# Patient Record
Sex: FEMALE | Race: WHITE | NOT HISPANIC OR LATINO | Employment: OTHER | URBAN - NONMETROPOLITAN AREA
[De-identification: names, ages, dates, MRNs, and addresses within clinical notes are randomized per-mention and may not be internally consistent; named-entity substitution may affect disease eponyms.]

---

## 2017-05-03 ENCOUNTER — APPOINTMENT (OUTPATIENT)
Dept: PREADMISSION TESTING | Facility: HOSPITAL | Age: 47
End: 2017-05-03

## 2017-05-03 VITALS
DIASTOLIC BLOOD PRESSURE: 86 MMHG | BODY MASS INDEX: 24.32 KG/M2 | WEIGHT: 146 LBS | HEART RATE: 72 BPM | SYSTOLIC BLOOD PRESSURE: 139 MMHG | HEIGHT: 65 IN | OXYGEN SATURATION: 99 %

## 2017-05-03 DIAGNOSIS — Z01.818 PRE-OP EXAM: Primary | ICD-10-CM

## 2017-05-03 LAB
25(OH)D3 SERPL-MCNC: 57.5 NG/ML (ref 30–100)
DEPRECATED RDW RBC AUTO: 38.4 FL (ref 40–54)
ERYTHROCYTE [DISTWIDTH] IN BLOOD BY AUTOMATED COUNT: 12.6 % (ref 12–15)
HCG SERPL QL: NEGATIVE
HCT VFR BLD AUTO: 41 % (ref 37–47)
HGB BLD-MCNC: 14.1 G/DL (ref 12–16)
MCH RBC QN AUTO: 29.1 PG (ref 28–32)
MCHC RBC AUTO-ENTMCNC: 34.4 G/DL (ref 33–36)
MCV RBC AUTO: 84.5 FL (ref 82–98)
PLATELET # BLD AUTO: 317 10*3/MM3 (ref 130–400)
PMV BLD AUTO: 9.2 FL (ref 6–12)
RBC # BLD AUTO: 4.85 10*6/MM3 (ref 4.2–5.4)
WBC NRBC COR # BLD: 6.35 10*3/MM3 (ref 4.8–10.8)

## 2017-05-03 RX ORDER — ALPRAZOLAM 0.25 MG/1
0.25 TABLET ORAL NIGHTLY PRN
COMMUNITY
End: 2017-05-08 | Stop reason: HOSPADM

## 2017-05-03 RX ORDER — ERGOCALCIFEROL 1.25 MG/1
50000 CAPSULE ORAL SEE ADMIN INSTRUCTIONS
COMMUNITY
End: 2017-05-08 | Stop reason: HOSPADM

## 2017-05-03 RX ORDER — SODIUM CHLORIDE 0.9 % (FLUSH) 0.9 %
1-10 SYRINGE (ML) INJECTION AS NEEDED
Status: CANCELLED | OUTPATIENT
Start: 2017-05-03

## 2017-05-03 RX ORDER — MULTIPLE VITAMINS W/ MINERALS TAB 9MG-400MCG
1 TAB ORAL DAILY
COMMUNITY
End: 2017-05-08 | Stop reason: HOSPADM

## 2017-05-03 RX ORDER — FLUOXETINE 10 MG/1
10 CAPSULE ORAL DAILY PRN
COMMUNITY
End: 2017-05-08 | Stop reason: HOSPADM

## 2017-05-04 LAB — PROLACTIN SERPL-MCNC: 11.4 NG/ML (ref 4.8–23.3)

## 2017-05-05 ENCOUNTER — HOSPITAL ENCOUNTER (OUTPATIENT)
Facility: HOSPITAL | Age: 47
Setting detail: HOSPITAL OUTPATIENT SURGERY
Discharge: HOME | End: 2017-05-05
Attending: OBSTETRICS & GYNECOLOGY | Admitting: OBSTETRICS & GYNECOLOGY

## 2017-05-05 ENCOUNTER — ANESTHESIA EVENT (OUTPATIENT)
Dept: PERIOP | Facility: HOSPITAL | Age: 47
End: 2017-05-05

## 2017-05-05 ENCOUNTER — ANESTHESIA (OUTPATIENT)
Dept: PERIOP | Facility: HOSPITAL | Age: 47
End: 2017-05-05

## 2017-05-05 VITALS
HEART RATE: 66 BPM | OXYGEN SATURATION: 98 % | RESPIRATION RATE: 18 BRPM | SYSTOLIC BLOOD PRESSURE: 113 MMHG | TEMPERATURE: 98.1 F | DIASTOLIC BLOOD PRESSURE: 65 MMHG

## 2017-05-05 DIAGNOSIS — N92.0 MENORRHAGIA: ICD-10-CM

## 2017-05-05 PROCEDURE — 25010000002 PROPOFOL 10 MG/ML EMULSION: Performed by: NURSE ANESTHETIST, CERTIFIED REGISTERED

## 2017-05-05 PROCEDURE — 25010000002 FENTANYL CITRATE (PF) 100 MCG/2ML SOLUTION: Performed by: ANESTHESIOLOGY

## 2017-05-05 PROCEDURE — 25010000002 DEXAMETHASONE PER 1 MG: Performed by: ANESTHESIOLOGY

## 2017-05-05 PROCEDURE — 25010000002 KETOROLAC TROMETHAMINE PER 15 MG: Performed by: NURSE ANESTHETIST, CERTIFIED REGISTERED

## 2017-05-05 PROCEDURE — 88305 TISSUE EXAM BY PATHOLOGIST: CPT | Performed by: OBSTETRICS & GYNECOLOGY

## 2017-05-05 PROCEDURE — 25010000002 MIDAZOLAM PER 1 MG: Performed by: ANESTHESIOLOGY

## 2017-05-05 PROCEDURE — 25010000002 ONDANSETRON PER 1 MG: Performed by: NURSE ANESTHETIST, CERTIFIED REGISTERED

## 2017-05-05 RX ORDER — MEPERIDINE HYDROCHLORIDE 25 MG/ML
12.5 INJECTION INTRAMUSCULAR; INTRAVENOUS; SUBCUTANEOUS
Status: DISCONTINUED | OUTPATIENT
Start: 2017-05-05 | End: 2017-05-05 | Stop reason: HOSPADM

## 2017-05-05 RX ORDER — SODIUM CHLORIDE 0.9 % (FLUSH) 0.9 %
1-10 SYRINGE (ML) INJECTION AS NEEDED
Status: DISCONTINUED | OUTPATIENT
Start: 2017-05-05 | End: 2017-05-05 | Stop reason: HOSPADM

## 2017-05-05 RX ORDER — LABETALOL HYDROCHLORIDE 5 MG/ML
5 INJECTION, SOLUTION INTRAVENOUS
Status: DISCONTINUED | OUTPATIENT
Start: 2017-05-05 | End: 2017-05-05 | Stop reason: HOSPADM

## 2017-05-05 RX ORDER — MIDAZOLAM HYDROCHLORIDE 1 MG/ML
2 INJECTION INTRAMUSCULAR; INTRAVENOUS
Status: DISCONTINUED | OUTPATIENT
Start: 2017-05-05 | End: 2017-05-05 | Stop reason: HOSPADM

## 2017-05-05 RX ORDER — DEXAMETHASONE SODIUM PHOSPHATE 4 MG/ML
4 INJECTION, SOLUTION INTRA-ARTICULAR; INTRALESIONAL; INTRAMUSCULAR; INTRAVENOUS; SOFT TISSUE ONCE AS NEEDED
Status: COMPLETED | OUTPATIENT
Start: 2017-05-05 | End: 2017-05-05

## 2017-05-05 RX ORDER — NALOXONE HCL 0.4 MG/ML
0.4 VIAL (ML) INJECTION AS NEEDED
Status: DISCONTINUED | OUTPATIENT
Start: 2017-05-05 | End: 2017-05-05 | Stop reason: HOSPADM

## 2017-05-05 RX ORDER — PROPOFOL 10 MG/ML
VIAL (ML) INTRAVENOUS AS NEEDED
Status: DISCONTINUED | OUTPATIENT
Start: 2017-05-05 | End: 2017-05-05 | Stop reason: SURG

## 2017-05-05 RX ORDER — SODIUM CHLORIDE, SODIUM LACTATE, POTASSIUM CHLORIDE, CALCIUM CHLORIDE 600; 310; 30; 20 MG/100ML; MG/100ML; MG/100ML; MG/100ML
9 INJECTION, SOLUTION INTRAVENOUS CONTINUOUS
Status: DISCONTINUED | OUTPATIENT
Start: 2017-05-05 | End: 2017-05-05 | Stop reason: HOSPADM

## 2017-05-05 RX ORDER — NAPROXEN SODIUM 220 MG
220 TABLET ORAL 2 TIMES DAILY PRN
COMMUNITY
End: 2017-05-08 | Stop reason: HOSPADM

## 2017-05-05 RX ORDER — HYDROCODONE BITARTRATE AND ACETAMINOPHEN 5; 325 MG/1; MG/1
1 TABLET ORAL EVERY 6 HOURS PRN
Status: DISCONTINUED | OUTPATIENT
Start: 2017-05-05 | End: 2017-05-05 | Stop reason: HOSPADM

## 2017-05-05 RX ORDER — IBUPROFEN 600 MG/1
600 TABLET ORAL EVERY 6 HOURS PRN
Status: DISCONTINUED | OUTPATIENT
Start: 2017-05-05 | End: 2017-05-05 | Stop reason: HOSPADM

## 2017-05-05 RX ORDER — ONDANSETRON 2 MG/ML
INJECTION INTRAMUSCULAR; INTRAVENOUS AS NEEDED
Status: DISCONTINUED | OUTPATIENT
Start: 2017-05-05 | End: 2017-05-05 | Stop reason: SURG

## 2017-05-05 RX ORDER — DEXTROSE MONOHYDRATE 25 G/50ML
12.5 INJECTION, SOLUTION INTRAVENOUS AS NEEDED
Status: DISCONTINUED | OUTPATIENT
Start: 2017-05-05 | End: 2017-05-05 | Stop reason: HOSPADM

## 2017-05-05 RX ORDER — OXYCODONE HYDROCHLORIDE AND ACETAMINOPHEN 5; 325 MG/1; MG/1
1 TABLET ORAL EVERY 6 HOURS PRN
Qty: 20 TABLET | Refills: 0 | Status: SHIPPED | OUTPATIENT
Start: 2017-05-05 | End: 2017-05-08 | Stop reason: HOSPADM

## 2017-05-05 RX ORDER — MORPHINE SULFATE 2 MG/ML
2 INJECTION, SOLUTION INTRAMUSCULAR; INTRAVENOUS
Status: DISCONTINUED | OUTPATIENT
Start: 2017-05-05 | End: 2017-05-05 | Stop reason: HOSPADM

## 2017-05-05 RX ORDER — SCOLOPAMINE TRANSDERMAL SYSTEM 1 MG/1
1 PATCH, EXTENDED RELEASE TRANSDERMAL ONCE
Status: DISCONTINUED | OUTPATIENT
Start: 2017-05-05 | End: 2017-05-05 | Stop reason: HOSPADM

## 2017-05-05 RX ORDER — DIPHENHYDRAMINE HYDROCHLORIDE 50 MG/ML
12.5 INJECTION INTRAMUSCULAR; INTRAVENOUS
Status: DISCONTINUED | OUTPATIENT
Start: 2017-05-05 | End: 2017-05-05 | Stop reason: HOSPADM

## 2017-05-05 RX ORDER — FENTANYL CITRATE 50 UG/ML
25 INJECTION, SOLUTION INTRAMUSCULAR; INTRAVENOUS
Status: DISCONTINUED | OUTPATIENT
Start: 2017-05-05 | End: 2017-05-05 | Stop reason: HOSPADM

## 2017-05-05 RX ORDER — MAGNESIUM HYDROXIDE 1200 MG/15ML
LIQUID ORAL AS NEEDED
Status: DISCONTINUED | OUTPATIENT
Start: 2017-05-05 | End: 2017-05-05 | Stop reason: HOSPADM

## 2017-05-05 RX ORDER — ONDANSETRON 2 MG/ML
4 INJECTION INTRAMUSCULAR; INTRAVENOUS ONCE AS NEEDED
Status: DISCONTINUED | OUTPATIENT
Start: 2017-05-05 | End: 2017-05-05 | Stop reason: HOSPADM

## 2017-05-05 RX ORDER — KETOROLAC TROMETHAMINE 30 MG/ML
INJECTION, SOLUTION INTRAMUSCULAR; INTRAVENOUS AS NEEDED
Status: DISCONTINUED | OUTPATIENT
Start: 2017-05-05 | End: 2017-05-05 | Stop reason: SURG

## 2017-05-05 RX ORDER — IPRATROPIUM BROMIDE AND ALBUTEROL SULFATE 2.5; .5 MG/3ML; MG/3ML
3 SOLUTION RESPIRATORY (INHALATION) ONCE AS NEEDED
Status: DISCONTINUED | OUTPATIENT
Start: 2017-05-05 | End: 2017-05-05 | Stop reason: HOSPADM

## 2017-05-05 RX ORDER — MIDAZOLAM HYDROCHLORIDE 1 MG/ML
1 INJECTION INTRAMUSCULAR; INTRAVENOUS
Status: DISCONTINUED | OUTPATIENT
Start: 2017-05-05 | End: 2017-05-05 | Stop reason: HOSPADM

## 2017-05-05 RX ORDER — HYDRALAZINE HYDROCHLORIDE 20 MG/ML
5 INJECTION INTRAMUSCULAR; INTRAVENOUS
Status: DISCONTINUED | OUTPATIENT
Start: 2017-05-05 | End: 2017-05-05 | Stop reason: HOSPADM

## 2017-05-05 RX ADMIN — SCOPALAMINE 1 PATCH: 1 PATCH, EXTENDED RELEASE TRANSDERMAL at 09:05

## 2017-05-05 RX ADMIN — SODIUM CHLORIDE, POTASSIUM CHLORIDE, SODIUM LACTATE AND CALCIUM CHLORIDE 9 ML/HR: 600; 310; 30; 20 INJECTION, SOLUTION INTRAVENOUS at 09:03

## 2017-05-05 RX ADMIN — DEXAMETHASONE SODIUM PHOSPHATE 4 MG: 4 INJECTION, SOLUTION INTRAMUSCULAR; INTRAVENOUS at 09:05

## 2017-05-05 RX ADMIN — PROPOFOL 150 MG: 10 INJECTION, EMULSION INTRAVENOUS at 09:20

## 2017-05-05 RX ADMIN — ONDANSETRON HYDROCHLORIDE 4 MG: 2 SOLUTION INTRAMUSCULAR; INTRAVENOUS at 09:25

## 2017-05-05 RX ADMIN — MIDAZOLAM HYDROCHLORIDE 2 MG: 1 INJECTION, SOLUTION INTRAMUSCULAR; INTRAVENOUS at 09:05

## 2017-05-05 RX ADMIN — KETOROLAC TROMETHAMINE 30 MG: 30 INJECTION, SOLUTION INTRAMUSCULAR at 09:30

## 2017-05-05 RX ADMIN — FENTANYL CITRATE 100 MCG: 50 INJECTION, SOLUTION INTRAMUSCULAR; INTRAVENOUS at 09:20

## 2017-05-05 RX ADMIN — LIDOCAINE HYDROCHLORIDE 0.5 ML: 10 INJECTION, SOLUTION EPIDURAL; INFILTRATION; INTRACAUDAL; PERINEURAL at 09:03

## 2017-05-10 LAB
CYTO UR: NORMAL
LAB AP CASE REPORT: NORMAL
LAB AP CLINICAL INFORMATION: NORMAL
Lab: NORMAL
PATH REPORT.FINAL DX SPEC: NORMAL
PATH REPORT.GROSS SPEC: NORMAL

## 2017-05-18 ENCOUNTER — HOSPITAL ENCOUNTER (OUTPATIENT)
Dept: ULTRASOUND IMAGING | Facility: HOSPITAL | Age: 47
Discharge: HOME | End: 2017-05-18
Admitting: NURSE PRACTITIONER

## 2017-05-18 ENCOUNTER — TRANSCRIBE ORDERS (OUTPATIENT)
Dept: ADMINISTRATIVE | Facility: HOSPITAL | Age: 47
End: 2017-05-18

## 2017-05-18 DIAGNOSIS — R10.32 ABDOMINAL PAIN, LEFT LOWER QUADRANT: Primary | ICD-10-CM

## 2017-05-18 DIAGNOSIS — R10.32 ABDOMINAL PAIN, LEFT LOWER QUADRANT: ICD-10-CM

## 2017-05-18 DIAGNOSIS — N94.89: ICD-10-CM

## 2017-05-18 PROCEDURE — 76830 TRANSVAGINAL US NON-OB: CPT

## 2017-09-15 ENCOUNTER — OFFICE VISIT (OUTPATIENT)
Dept: OBGYN | Age: 47
End: 2017-09-15
Payer: COMMERCIAL

## 2017-09-15 VITALS — BODY MASS INDEX: 25.16 KG/M2 | WEIGHT: 151 LBS | HEIGHT: 65 IN

## 2017-09-15 DIAGNOSIS — R68.82 LOW LIBIDO: ICD-10-CM

## 2017-09-15 DIAGNOSIS — G43.829 MENSTRUAL MIGRAINE WITHOUT STATUS MIGRAINOSUS, NOT INTRACTABLE: ICD-10-CM

## 2017-09-15 DIAGNOSIS — R63.5 WEIGHT GAIN: ICD-10-CM

## 2017-09-15 DIAGNOSIS — R23.2 HOT FLASHES: ICD-10-CM

## 2017-09-15 DIAGNOSIS — R53.83 FATIGUE, UNSPECIFIED TYPE: Primary | ICD-10-CM

## 2017-09-15 DIAGNOSIS — R45.89 MOODINESS: ICD-10-CM

## 2017-09-15 DIAGNOSIS — N92.0 MENORRHAGIA WITH REGULAR CYCLE: ICD-10-CM

## 2017-09-15 PROCEDURE — 99204 OFFICE O/P NEW MOD 45 MIN: CPT | Performed by: OBSTETRICS & GYNECOLOGY

## 2017-09-15 RX ORDER — FLUOXETINE HYDROCHLORIDE 20 MG/1
CAPSULE ORAL
COMMUNITY
Start: 2017-09-01

## 2017-09-15 RX ORDER — ALPRAZOLAM 0.25 MG/1
TABLET ORAL
COMMUNITY
Start: 2017-09-06

## 2017-09-15 ASSESSMENT — ENCOUNTER SYMPTOMS
RESPIRATORY NEGATIVE: 1
GASTROINTESTINAL NEGATIVE: 1
ALLERGIC/IMMUNOLOGIC NEGATIVE: 1
EYES NEGATIVE: 1

## 2018-08-20 ENCOUNTER — TRANSCRIBE ORDERS (OUTPATIENT)
Dept: PHYSICAL THERAPY | Facility: HOSPITAL | Age: 48
End: 2018-08-20

## 2018-08-20 DIAGNOSIS — M54.5 LOW BACK PAIN, UNSPECIFIED BACK PAIN LATERALITY, UNSPECIFIED CHRONICITY, WITH SCIATICA PRESENCE UNSPECIFIED: Primary | ICD-10-CM

## 2018-08-27 ENCOUNTER — HOSPITAL ENCOUNTER (OUTPATIENT)
Dept: PHYSICAL THERAPY | Facility: HOSPITAL | Age: 48
Setting detail: THERAPIES SERIES
Discharge: HOME | End: 2018-08-27

## 2018-08-27 DIAGNOSIS — M54.5 LOW BACK PAIN, UNSPECIFIED BACK PAIN LATERALITY, UNSPECIFIED CHRONICITY, WITH SCIATICA PRESENCE UNSPECIFIED: Primary | ICD-10-CM

## 2018-08-27 PROCEDURE — 97161 PT EVAL LOW COMPLEX 20 MIN: CPT | Performed by: PHYSICAL THERAPIST

## 2018-08-27 PROCEDURE — 97110 THERAPEUTIC EXERCISES: CPT | Performed by: PHYSICAL THERAPIST

## 2018-08-28 NOTE — THERAPY EVALUATION
"    Outpatient Physical Therapy Ortho Initial Evaluation  [facility]     Patient Name: Vanessa Dunaway  : 1970  MRN: [id number]  Today's Date: 2018      Visit Date: 2018     Subjective Improvement: N/A      Attendance:   Approved:   30 visits        MD follow up:   CLAUDIA VALENZUELA date:   18        There is no problem list on file for this patient.       Past Medical History:   Diagnosis Date   • Frequent menstruation    • Menstruation, excessive    • Mitral valve prolapse    • Palpitations    • PONV (postoperative nausea and vomiting)     DIFFICULTY WAKING UP        Past Surgical History:   Procedure Laterality Date   • BREAST BIOPSY Bilateral    • CERVICAL LAMINECTOMY     • D&C HYSTEROSCOPY ENDOMETRIAL ABLATION N/A 2017    Procedure: DILATATION AND CURETTAGE HYSTEROSCOPY NOVASURE ENDOMETRIAL ABLATION;  Surgeon: Lesvia Bermudez DO;  Location: [facility] OR;  Service:    • LASIK       Allergies   Allergen Reactions   • Augmentin [Amoxicillin-Pot Clavulanate] Rash   • Ceclor [Cefaclor] Rash   • Erythromycin Rash   • Latex Rash     Notified Kely, in surgery scheduling, of latex allergy   • Penicillins Rash     No current outpatient prescriptions on file.      Visit Dx:     ICD-10-CM ICD-9-CM   1. Low back pain, unspecified back pain laterality, unspecified chronicity, with sciatica presence unspecified M54.5 724.2             Patient History     Row Name 18 1600             History    Chief Complaint Pain;Numbness  -KG      Date Current Problem(s) Began --   Started in 2018  -KG      Brief Description of Current Complaint Pt presents with c/o R hip and RLE pain that began in 2018. Pt states she has had an x-ray which she states showed \"shifting at L4-L5\". Had an MRI completed today but is unsure of the results. Pt states prior to her onset of pain she would walk 6 miles per day. 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 Has " to shift throughout the day from a standing to seated position to ease the pain. Reports she only has pain only in R low back/post hip, only numbness & tingling in RLE, that sometimes goes all the way down to her foot.  -KG      Previous treatment for THIS PROBLEM Chiropractor  -KG      Patient/Caregiver Goals Relieve pain;Return to prior level of function;Improve mobility;Improve strength  -KG      Occupation/sports/leisure activities Pt is an APRN; pt works for [employer]  -KG         Pain     Pain Location Back  -KG      Pain at Present 7  -KG      Pain at Best 4  -KG      Pain at Worst 8  -KG      Pain Frequency Constant/continuous  -KG      Pain Description Aching;Tingling;Numbness  -KG      What Performance Factors Make the Current Problem(s) WORSE? Transfers/rolling in bed  -KG      Pain Comments Takes Robaxin in a.m./p.m.  -KG      Tolerance Time- Standing For short periods  -KG      Tolerance Time- Lying Increased pain lying on back; pt sleeps on either side mostly  -KG      Is your sleep disturbed? Yes  -KG      Difficulties at work? Able to complete all duties but with pain; mindful of lifting  -KG      Difficulties with ADL's? Independent  -KG        User Key  (r) = Recorded By, (t) = Taken By, (c) = Cosigned By    Initials Name Provider Type    KG Kaleigh Jim, PT Physical Therapist                PT Ortho     Row Name 08/27/18 1700       Precautions and Contraindications    Precautions/Limitations no known precautions/limitations  -KG    Contraindications LATEX ALLERGY  -KG       Subjective Pain    Able to rate subjective pain? yes  -KG    Pre-Treatment Pain Level 7  -KG    Post-Treatment Pain Level 7  -KG         Posture/Observation Comments No acute distress. Ambulates with non-antalgic gait, no AD. Pt seated with increased weight on L side during eval. Pt reports she is uncomfortable lying supine. Guarded with mat table transfers this date. Malalignment noted at pelvis this date with R ASIS  "inferior vs. L; corrected with MET this date for posterior rotation of innominate.  -KG       Quarter Clearing    Quarter Clearing Lower Quarter Clearing  -KG       Neural Tension Signs- Lower Quarter Clearing    Slump/SLR Right:;Positive  -KG       Sensory Screen for Light Touch- Lower Quarter Clearing    S1 (bottom of foot) Right:;Diminished  -KG       Lumbar ROM Screen- Lower Quarter Clearing    Lumbar Flexion Normal  -KG    Lumbar Extension Normal   Pain  -KG    Lumbar Lateral Flexion Impaired   R/L SB 1\" above knee jt line; pain R  -KG       Special Tests/Palpation    Special Tests/Palpation Lumbar/SI  -KG       Lumbosacral Palpation    Lumbosacral Palpation? Yes  -KG       MMT (Manual Muscle Testing)    Additional Documentation General Assessment (Manual Muscle Testing) (Group)  -KG      User Key  (r) = Recorded By, (t) = Taken By, (c) = Cosigned By    Initials Name Provider Type    KG Kaleigh Jim, PT Physical Therapist        ASIS compression Negative, ASIS distraction negative, Urban's/Guillermo's test Negative, post thigh sheer positive    TTP: R SI joint, L4-S1 SP's with PA's, Bilateral piriformis, bilateral lumbar paraspinals    ROM: BLE hip AROM WNL with the exception of bilateral hip passive IR, which elicited pain.  RLE MMT: hip flex 4-/5, hip abd 3+/5, knee ext 4/5, knee flex 4-/5, ankle DF 5/5  LLE MMT: hip flex 4/5, hip abd 3+/5, knee ext 4+/5, knee flex 4/5, ankle DF 5/5    Flexibility: Hamstrings mildly limited bilaterally, hip external rotators mildly limited bilaterally.          Therapy Education  Education Details: POC education. Anatomy education. HEP: DKTC stretch, piriformis stretch, iso TrA contraction, SL clamshells, bridges  Given: HEP;Symptoms/condition management;Pain management;Posture/body mechanics  Program: New  How Provided: Verbal, Demonstration, Written  Provided to: Patient  Level of Understanding: Teach back education performed, Verbalized, Demonstrated              PT OP " Goals     Row Name 08/27/18 1600          PT Short Term Goals    STG Date to Achieve 09/17/18  -KG     STG 1 Independent/compliant with HEP  -KG     STG 1 Progress New  -KG     STG 2 Tolerate 45 minute treatment session without increased pain  -KG     STG 2 Progress New  -KG     STG 3 Demonstrate independence in isometric TrA activities throughout treatment session  -KG     STG 3 Progress New  -KG     STG 4 Demonstrate bilateral hip abd MMT to 4-/5 -KG     STG 4 Progress New  -KG        Long Term Goals    LTG Date to Achieve 10/1/18  -KG     LTG 1 Subjectively report 60% improvement or greater  -KG     LTG 1 Progress New  -KG     LTG 2 Improve modified oswestry score to 25% or less  -KG     LTG 2 Progress New  -KG     LTG 3 Demonstrate bilateral lumbar SB AROM WNL without increased pain  -KG     LTG 3 Progress New  -KG     LTG 4 Demonstrate bilateral hip flex MMT to 4+/5  -KG     LTG 4 Progress New  -KG     LTG 5 Demonstrate bilateral hip abd MM to 4+/5  -KG     LTG 5 Progress New  -KG     LTG 6 Perform 10-15 minutes of standing therex without increased pain/RLE symptoms     LTG 6 Progress New  -KG        Time Calculation    PT Goal Re-Cert Due Date 09/17/18  -KG       User Key  (r) = Recorded By, (t) = Taken By, (c) = Cosigned By    Initials Name Provider Type    ROBERTA Kaleigh Jim, PT Physical Therapist            PT Assessment/Plan     Row Name 08/27/18 1700          PT Assessment    Functional Limitations Limitation in home management;Limitations in community activities;Limitations in functional capacity and performance;Performance in leisure activities;Performance in self-care ADL;Performance in work activities -KG     Impairments Impaired flexibility;Impaired muscle endurance;Joint mobility;Muscle strength;Pain;Posture;Range of motion  -KG     Assessment Comments Pt is a 47 y.o. femal presenting with R posterior hip/low back pain & RLE numbness. Pt demonstrates positive neurotension signs in RLE. Pt with  moderate deficits in bilateral hip/glute strength as well as overall core stability. Pt will benefit from skilled PT services to address these deficits for improvements in overall postural/core strengthening, functional hip strengthening, and tolerance to daily functional activities.   -KG     Please refer to paper survey for additional self-reported information Yes  -KG     Rehab Potential Good  -KG     Patient/caregiver participated in establishment of treatment plan and goals Yes  -KG     Patient would benefit from skilled therapy intervention Yes  -KG        PT Plan    PT Frequency 2x/week  -KG     Predicted Duration of Therapy Intervention (Therapy Eval) 4-6 weeks  -KG     Planned CPT's? PT EVAL LOW COMPLEXITY: 75104;PT RE-EVAL: 71518;PT THER PROC EA 15 MIN: 32877;PT THER ACT EA 15 MIN: 20625;PT MANUAL THERAPY EA 15 MIN: 96705;PT NEUROMUSC RE-EDUCATION EA 15 MIN: 02580;PT AQUATIC THERAPY EA 15 MIN: 85304;PT SELF CARE/HOME MGMT/TRAIN EA 15: 48416;PT ELECTRICAL STIM UNATTEND: ;PT TRACTION LUMBAR: 67072;PT THER SUPP EA 15 MIN  -KG     Physical Therapy Interventions (Optional Details) home exercise program;joint mobilization;lumbar stabilization;manual therapy techniques;modalities;neuromuscular re-education;patient/family education;postural re-education;ROM (Range of Motion);strengthening;stretching;aquatics exercise  -KG     PT Plan Comments Monitor alignment. Focus on core stabilization and bilateral hip/glute strengthening. STM/MFR to lumbar paraspinals prn, lumbosacral joint mobilizations, possible mechanical traction. Progress towards standing activities as tolerated.  -KG       User Key  (r) = Recorded By, (t) = Taken By, (c) = Cosigned By    Initials Name Provider Type    KG Kaleigh Jim, PT Physical Therapist                   Modified Oswestry: 42%            Time Calculation:     Therapy Suggested Charges     Code   Minutes Charges    None             Start Time: 1657  Stop Time: 1748  Time  Calculation (min): 51 min  Total Timed Code Minutes- PT: 20 minute(s)     Therapy Charges for Today     Code Description Service Date Service Provider Modifiers Qty    74069812554 HC PT EVAL LOW COMPLEXITY 2 8/27/2018 Kaleigh Jim, PT GP 1    22238169004 HC PT THER PROC EA 15 MIN 8/27/2018 Kaleigh Jim, PT GP 1                    Kaleigh Jim, PT  8/28/2018

## 2018-08-29 ENCOUNTER — HOSPITAL ENCOUNTER (OUTPATIENT)
Dept: PHYSICAL THERAPY | Facility: HOSPITAL | Age: 48
Setting detail: THERAPIES SERIES
Discharge: HOME | End: 2018-08-29

## 2018-08-29 DIAGNOSIS — M54.5 LOW BACK PAIN, UNSPECIFIED BACK PAIN LATERALITY, UNSPECIFIED CHRONICITY, WITH SCIATICA PRESENCE UNSPECIFIED: Primary | ICD-10-CM

## 2018-08-29 PROCEDURE — 97110 THERAPEUTIC EXERCISES: CPT | Performed by: PHYSICAL THERAPIST

## 2018-09-04 ENCOUNTER — HOSPITAL ENCOUNTER (OUTPATIENT)
Dept: PHYSICAL THERAPY | Facility: HOSPITAL | Age: 48
Setting detail: THERAPIES SERIES
Discharge: HOME | End: 2018-09-04

## 2018-09-04 DIAGNOSIS — M54.5 LOW BACK PAIN, UNSPECIFIED BACK PAIN LATERALITY, UNSPECIFIED CHRONICITY, WITH SCIATICA PRESENCE UNSPECIFIED: Primary | ICD-10-CM

## 2018-09-04 PROCEDURE — 97110 THERAPEUTIC EXERCISES: CPT | Performed by: PHYSICAL THERAPIST

## 2018-09-04 NOTE — THERAPY TREATMENT NOTE
"    Outpatient Physical Therapy Ortho Treatment Note  [facility]     Patient Name: Vanessa Dunaway  : 1970  MRN: [id number]  Today's Date: 2018      Visit Date: 2018     Subjective Improvement: \"little\"      Attendance: 3/3  Approved:    30 visits       MD follow up:  CLAUDIA VALENZUELA date:   18        Visit Dx:    ICD-10-CM ICD-9-CM   1. Low back pain, unspecified back pain laterality, unspecified chronicity, with sciatica presence unspecified M54.5 724.2       There is no problem list on file for this patient.       Past Medical History:   Diagnosis Date   • Frequent menstruation    • Menstruation, excessive    • Mitral valve prolapse    • Palpitations    • PONV (postoperative nausea and vomiting)     DIFFICULTY WAKING UP        Past Surgical History:   Procedure Laterality Date   • BREAST BIOPSY Bilateral    • CERVICAL LAMINECTOMY     • D&C HYSTEROSCOPY ENDOMETRIAL ABLATION N/A 2017    Procedure: DILATATION AND CURETTAGE HYSTEROSCOPY NOVASURE ENDOMETRIAL ABLATION;  Surgeon: Lesvia Bermudez DO;  Location: [facility] OR;  Service:    • LASIK               PT Ortho     Row Name 18 1600       Precautions and Contraindications    Precautions/Limitations no known precautions/limitations  -KG    Contraindications LATEX ALLERGY  -KG       Posture/Observations    Posture/Observations Comments No acute distress. Non antalgic gait. R ASIS inferior vs L; corrected well with MET  -KG      User Key  (r) = Recorded By, (t) = Taken By, (c) = Cosigned By    Initials Name Provider Type    Kaleigh Rizvi, PT Physical Therapist                            PT Assessment/Plan     Row Name 18 1600          PT Assessment    Functional Limitations Limitation in home management;Limitations in community activities;Limitations in functional capacity and performance;Performance in leisure activities;Performance in self-care ADL;Performance in work activities  -KG     Impairments " Impaired flexibility;Impaired muscle endurance;Joint mobility;Muscle strength;Pain;Posture;Range of motion  -KG     Assessment Comments Pt with less pain today prior to start of treatment. Improved control noted with SL clamshells and bridges. Good initial tolerance/performance with seated core; did have some R post hip discomfort/catching with LAQ.  -KG     Rehab Potential Good  -KG     Patient/caregiver participated in establishment of treatment plan and goals Yes  -KG     Patient would benefit from skilled therapy intervention Yes  -KG        PT Plan    PT Frequency 2x/week  -KG     Predicted Duration of Therapy Intervention (Therapy Eval) 4-6 weeks  -KG     PT Plan Comments Possible lumbar mechanical traction next visit. Continue seated core progressions. Physioball DK next.  -KG       User Key  (r) = Recorded By, (t) = Taken By, (c) = Cosigned By    Initials Name Provider Type    Kaleigh Rizvi, PT Physical Therapist                Modalities     Row Name 09/04/18 1600             Precautions    Existing Precautions/Restrictions no known precautions/restrictions  -KG         Ice    Patient denies application of Ice Yes  -KG      Patient reports will apply ice at home to involved area Yes  -KG        User Key  (r) = Recorded By, (t) = Taken By, (c) = Cosigned By    Initials Name Provider Type    Kaleigh Rizvi, PT Physical Therapist                Exercises     Row Name 09/04/18 1600             Precautions    Existing Precautions/Restrictions no known precautions/restrictions  -KG         Subjective Comments    Subjective Comments Pt states she thinks things are getting better. Reports the numbness in her RLE is just there, it's not aggrevated today. Able to perform therex now with less fatigue.  -KG         Subjective Pain    Able to rate subjective pain? yes  -KG      Pre-Treatment Pain Level 3  -KG      Post-Treatment Pain Level 3  -KG         Aquatics    Aquatics performed? No  -KG          "Exercise 1    Exercise Name 1 Pro II for endurance/ROM  -KG      Time 1 8 min  -KG      Additional Comments L 2.0  -KG         Exercise 2    Exercise Name 2 Standing hamstring stretch  -KG      Cueing 2 Verbal  -KG      Reps 2 2  -KG      Time 2 30\"  -KG         Exercise 3    Exercise Name 3 Gastroc incline stretch  -KG      Cueing 3 Verbal  -KG      Reps 3 2  -KG      Time 3 30\" hold  -KG         Exercise 4    Exercise Name 4 Supine piriformis stretch  -KG      Cueing 4 Verbal  -KG      Reps 4 2  -KG      Time 4 30\" hold  -KG         Exercise 5    Exercise Name 5 Seated PN education  -KG      Cueing 5 Verbal;Demo  -KG      Time 5 3 min  -KG         Exercise 6    Exercise Name 6 Seated PN March + TA  -KG      Cueing 6 Verbal  -KG      Sets 6 2  -KG      Reps 6 10  -KG         Exercise 7    Exercise Name 7 Seated PN LAQ + TA  -KG      Cueing 7 Verbal  -KG      Sets 7 1  -KG      Reps 7 10  -KG         Exercise 8    Exercise Name 8 Seated PN + arm raise to 90 deg flex with bar  -KG      Cueing 8 Verbal  -KG      Sets 8 2  -KG      Reps 8 10  -KG      Additional Comments 1# Tbar  -KG         Exercise 9    Exercise Name 9 Standing hip abd  -KG      Cueing 9 Verbal  -KG      Sets 9 1  -KG      Reps 9 10  -KG      Additional Comments Bilateral; increased RLE numbness so deferred further reps  -KG         Exercise 10    Exercise Name 10 SL Clamshells  -KG      Cueing 10 Verbal  -KG      Sets 10 2  -KG      Reps 10 10  -KG      Additional Comments Bilateral  -KG         Exercise 11    Exercise Name 11 Bridges + TA  -KG      Cueing 11 Verbal  -KG      Sets 11 2  -KG      Reps 11 10  -KG         Exercise 12    Exercise Name 12 Iso TA + alt LE's  -KG      Cueing 12 Verbal  -KG      Reps 12 1x10  -KG        User Key  (r) = Recorded By, (t) = Taken By, (c) = Cosigned By    Initials Name Provider Type    KG Kaleigh Jim, PT Physical Therapist                        Manual Rx (last 36 hours)      Manual Treatments     Row " Name 09/04/18 1600             Manual Rx 1    Manual Rx 1 Location MET/manual posterior rotation R innominate  -KG      Manual Rx 1 Duration 5 min  -KG        User Key  (r) = Recorded By, (t) = Taken By, (c) = Cosigned By    Initials Name Provider Type    Kaleigh Rizvi, PT Physical Therapist                PT OP Goals     Row Name 09/04/18 1600          PT Short Term Goals    STG Date to Achieve 09/17/18  -KG     STG 1 Independent/compliant with HEP  -KG     STG 1 Progress Progressing  -KG     STG 2 Tolerate 45 minute treatment session without increased pain  -KG     STG 2 Progress Progressing  -KG     STG 3 Demonstrate independence in isometric TrA activities throughout treatment session  -KG     STG 3 Progress Progressing  -KG     STG 4 Demonstrate bilateral hip abd MMT to 4-/5  -KG     STG 4 Progress Progressing  -KG        Long Term Goals    LTG Date to Achieve 10/01/18  -KG     LTG 1 Subjectively report 60% improvement or greater  -KG     LTG 1 Progress Progressing  -KG     LTG 2 Improve modified oswestry score to 25% or less  -KG     LTG 2 Progress Progressing  -KG     LTG 3 Demonstrate bilateral lumbar SB AROM WNL without increased pain  -KG     LTG 3 Progress Progressing  -KG     LTG 4 Demonstrate bilateral hip flex MMT to 4+/5  -KG     LTG 4 Progress Progressing  -KG     LTG 5 Demonstrate bilateral hip abd MM to 4+/5  -KG     LTG 5 Progress Progressing  -KG     LTG 6 Perform 10-15 minutes of standing therex without increased pain/RLE symptoms  -KG     LTG 6 Progress Progressing  -KG        Time Calculation    PT Goal Re-Cert Due Date 09/17/18  -KG       User Key  (r) = Recorded By, (t) = Taken By, (c) = Cosigned By    Initials Name Provider Type    Kaleigh Rizvi, PT Physical Therapist          Therapy Education  Given: HEP, Symptoms/condition management, Pain management, Posture/body mechanics  Program: Reinforced  How Provided: Verbal  Provided to: Patient  Level of Understanding: Verbalized,  Demonstrated              Time Calculation:   Start Time: 1650  Stop Time: 1735  Time Calculation (min): 45 min  Total Timed Code Minutes- PT: 45 minute(s)  Therapy Suggested Charges     Code   Minutes Charges    None           Therapy Charges for Today     Code Description Service Date Service Provider Modifiers Qty    86474003501 HC PT THER PROC EA 15 MIN 9/4/2018 Kaleigh Jim, PT GP 3                    Kaleigh Jim, PT  9/4/2018

## 2018-09-06 ENCOUNTER — HOSPITAL ENCOUNTER (OUTPATIENT)
Dept: PHYSICAL THERAPY | Facility: HOSPITAL | Age: 48
Setting detail: THERAPIES SERIES
Discharge: HOME | End: 2018-09-06

## 2018-09-06 DIAGNOSIS — M54.5 LOW BACK PAIN, UNSPECIFIED BACK PAIN LATERALITY, UNSPECIFIED CHRONICITY, WITH SCIATICA PRESENCE UNSPECIFIED: Primary | ICD-10-CM

## 2018-09-06 PROCEDURE — 97110 THERAPEUTIC EXERCISES: CPT | Performed by: PHYSICAL THERAPIST

## 2018-09-06 PROCEDURE — 97140 MANUAL THERAPY 1/> REGIONS: CPT | Performed by: PHYSICAL THERAPIST

## 2018-09-06 NOTE — THERAPY TREATMENT NOTE
"    Outpatient Physical Therapy Ortho Treatment Note  [facility]     Patient Name: Vanessa Dunaway  : 1970  MRN: [id number]  Today's Date: 2018      Visit Date: 2018     Subjective Improvement: \"little\"      Attendance:   Approved:   30 visits        MD follow up:    CLAUDIA VALENZUELA date:    18       Visit Dx:    ICD-10-CM ICD-9-CM   1. Low back pain, unspecified back pain laterality, unspecified chronicity, with sciatica presence unspecified M54.5 724.2       There is no problem list on file for this patient.       Past Medical History:   Diagnosis Date   • Frequent menstruation    • Menstruation, excessive    • Mitral valve prolapse    • Palpitations    • PONV (postoperative nausea and vomiting)     DIFFICULTY WAKING UP        Past Surgical History:   Procedure Laterality Date   • BREAST BIOPSY Bilateral    • CERVICAL LAMINECTOMY     • D&C HYSTEROSCOPY ENDOMETRIAL ABLATION N/A 2017    Procedure: DILATATION AND CURETTAGE HYSTEROSCOPY NOVASURE ENDOMETRIAL ABLATION;  Surgeon: Lesvia Bermudez DO;  Location: [facility] OR;  Service:    • LASIK               PT Ortho     Row Name 18 1600       Precautions and Contraindications    Precautions/Limitations no known precautions/limitations  -KG    Contraindications LATEX ALLERGY  -KG       Posture/Observations    Posture/Observations Comments No acute distress. Non antalgic gait. R ASIS inferior vs L; corrected well with MET  -KG      User Key  (r) = Recorded By, (t) = Taken By, (c) = Cosigned By    Initials Name Provider Type    Kaleigh Rizvi, PT Physical Therapist            Therapy Education  Education Details: Reinforced prone tband IR/heel squeezes, added supine hip flexor stretch  Given: HEP;Symptoms/condition management;Pain management;Posture/body mechanics  Program: Progressed  How Provided: Verbal, Demonstration, Written  Provided to: Patient  Level of Understanding: Teach back education performed, " "Verbalized, Demonstrated                        Exercises     Row Name 09/06/18 1700             Precautions    Existing Precautions/Restrictions no known precautions/restrictions  -KG         Subjective Comments    Subjective Comments Pt states her back/hip was really hurting yesterday. Took off work, iced and used TENS and tried to do a few exercises. Pain not as bad today but RLE numbness aggrevated this date.  -KG         Subjective Pain    Able to rate subjective pain? yes  -KG      Pre-Treatment Pain Level 6  -KG      Post-Treatment Pain Level 5  -KG         Aquatics    Aquatics performed? No  -KG         Exercise 1    Exercise Name 1 Manual; see manual section for details  -KG         Exercise 2    Exercise Name 2 Supine hip flexor/pamela stretch  -KG      Cueing 2 Verbal  -KG      Reps 2 2  -KG      Time 2 30\" hold  -KG         Exercise 3    Exercise Name 3 Prone hip ER/heel squeezes  -KG      Cueing 3 Verbal  -KG      Sets 3 2  -KG      Reps 3 10  -KG      Time 3 5\" hold  -KG         Exercise 4    Exercise Name 4 Prone hip IR  -KG      Cueing 4 Verbal  -KG      Sets 4 2  -KG      Reps 4 10  -KG      Additional Comments Yellow tband  -KG        User Key  (r) = Recorded By, (t) = Taken By, (c) = Cosigned By    Initials Name Provider Type    KG Kaleigh Jim, PT Physical Therapist        Manual Therapy:    R piriformis STM/MFR 5 min  L sacral springing/mobilization; grade II; 4 min  MET/manual posterior rotation R innominate; 4 min  MET/IS for correction of L sacral rotation; 4 min  R hip flexor MFR; 2 min  R long axis hip distraction; 4 x 25\"               PT OP Goals     Row Name 09/06/18 1700          PT Short Term Goals    STG Date to Achieve 09/17/18  -KG     STG 1 Independent/compliant with HEP  -KG     STG 1 Progress Progressing  -KG     STG 2 Tolerate 45 minute treatment session without increased pain  -KG     STG 2 Progress Progressing  -KG     STG 3 Demonstrate independence in isometric TrA " activities throughout treatment session -KG     STG 3 Progress Progressing  -KG     STG 4 Improve R shoulder abd PROM to 150 deg  -KG     STG 4 Progress New  -KG     STG 5 Demonstrate bilateral hip abd MMT to 4-/5  -KG     STG 5 Progress Progressing  -KG        Long Term Goals    LTG Date to Achieve 10/1/18  -KG     LTG 1 Subjectively report 60% improvement or greater  -KG     LTG 1 Progress Progressing  -KG     LTG 2 Improve modified oswestry score to 25% or less  -KG     LTG 2 Progress Progressing  -KG     LTG 3 Demonstrate bilateral lumbar SB AROM WNL without increased pain  -KG     LTG 3 Progress Progressing  -KG     LTG 4 Demonstrate bilateral hip flex MMT to 4+/5  -KG     LTG 4 Progress Progressing  -KG     LTG 5 Demonstrate bilateral hip abd MM to 4+/5  -KG     LTG 5 Progress Progressing  -KG     LTG 6 Perform 10-15 minutes of standing therex without increased pain/RLE symptoms     LTG 6 Progress Progressing  -KG        Time Calculation    PT Goal Re-Cert Due Date 09/17/18  -KG       User Key  (r) = Recorded By, (t) = Taken By, (c) = Cosigned By    Initials Name Provider Type    KG Kaleigh Jim, PT Physical Therapist             PT Assessment/Plan     Row Name 09/06/18 1700          PT Assessment    Functional Limitations Limitation in home management;Limitations in community activities;Limitations in functional capacity and performance;Performance in leisure activities;Performance in self-care ADL;Performance in work activities -KG     Impairments Impaired flexibility;Impaired muscle endurance;Joint mobility;Muscle strength;Pain;Posture;Range of motion  -KG     Assessment Comments Pt with increased pain this date and focused treatment on manual & correciton of malalignment issues. Pt taut at R hip flexors & TTP at R piriformis. R PSIS deep and superior vs L, R ASIS inferior.Pt tolerated manual/MET fairly well with decreased pain post treatment.   -KG     Rehab Potential Good  -KG     Patient/caregiver  participated in establishment of treatment plan and goals Yes  -KG     Patient would benefit from skilled therapy intervention Yes  -KG        PT Plan    PT Frequency 2x/week  -KG     Predicted Duration of Therapy Intervention (Therapy Eval) 4-6 weeks  -KG     PT Plan Comments Continue to monitor alignment and correct as appropriate. Continue prone activities. Hip flexor/piriformis release.  -KG       User Key  (r) = Recorded By, (t) = Taken By, (c) = Cosigned By    Initials Name Provider Type    KG Kaleigh Jim, PT Physical Therapist                 Time Calculation:   Start Time: 1654  Stop Time: 1737  Time Calculation (min): 43 min  Therapy Suggested Charges     Code   Minutes Charges    None           Therapy Charges for Today     Code Description Service Date Service Provider Modifiers Qty    58450970530 HC PT THER PROC EA 15 MIN 9/6/2018 Kaleigh Jim, PT GP 1    46648488005 HC PT MANUAL THERAPY EA 15 MIN 9/6/2018 Kaleigh Jim, PT GP 2                    Kaleigh Jim PT  9/6/2018

## 2018-09-10 ENCOUNTER — APPOINTMENT (OUTPATIENT)
Dept: PHYSICAL THERAPY | Facility: HOSPITAL | Age: 48
End: 2018-09-10

## 2018-09-11 ENCOUNTER — HOSPITAL ENCOUNTER (OUTPATIENT)
Dept: PHYSICAL THERAPY | Facility: HOSPITAL | Age: 48
Setting detail: THERAPIES SERIES
End: 2018-09-11

## 2018-09-12 ENCOUNTER — HOSPITAL ENCOUNTER (OUTPATIENT)
Dept: PHYSICAL THERAPY | Facility: HOSPITAL | Age: 48
Setting detail: THERAPIES SERIES
Discharge: HOME | End: 2018-09-12

## 2018-09-12 DIAGNOSIS — M54.5 LOW BACK PAIN, UNSPECIFIED BACK PAIN LATERALITY, UNSPECIFIED CHRONICITY, WITH SCIATICA PRESENCE UNSPECIFIED: Primary | ICD-10-CM

## 2018-09-12 PROCEDURE — 97140 MANUAL THERAPY 1/> REGIONS: CPT | Performed by: PHYSICAL THERAPIST

## 2018-09-12 PROCEDURE — 97110 THERAPEUTIC EXERCISES: CPT | Performed by: PHYSICAL THERAPIST

## 2018-09-13 NOTE — THERAPY TREATMENT NOTE
"    Outpatient Physical Therapy Ortho Treatment Note  [facility]     Patient Name: Vanessa Dunaway  : 1970  MRN: [id number]  Today's Date: 2018      Visit Date: 2018     Subjective Improvement:  \"some better\"     Attendance:   Approved:   30 visits        MD follow up:    CLAUDIA VALENZUELA date:     18      Visit Dx:    ICD-10-CM ICD-9-CM   1. Low back pain, unspecified back pain laterality, unspecified chronicity, with sciatica presence unspecified M54.5 724.2       There is no problem list on file for this patient.       Past Medical History:   Diagnosis Date   • Frequent menstruation    • Menstruation, excessive    • Mitral valve prolapse    • Palpitations    • PONV (postoperative nausea and vomiting)     DIFFICULTY WAKING UP        Past Surgical History:   Procedure Laterality Date   • BREAST BIOPSY Bilateral    • CERVICAL LAMINECTOMY     • D&C HYSTEROSCOPY ENDOMETRIAL ABLATION N/A 2017    Procedure: DILATATION AND CURETTAGE HYSTEROSCOPY NOVASURE ENDOMETRIAL ABLATION;  Surgeon: Lesvia Bermudez DO;  Location: [facility] OR;  Service:    • LASIK               PT Ortho     Row Name 18 1600       Subjective Comments    Subjective Comments Pt states she's not having any numbness in R leg today but her post hip/low back feels aggrevated. States it feels better to stand today than sit. States she felt a little better over the weekend. States she things the prone heel squeezes and HL hip abd are aggrevating things.  -KG       Precautions and Contraindications    Precautions/Limitations no known precautions/limitations  -KG    Contraindications LATEX ALLERGY  -KG       Subjective Pain    Able to rate subjective pain? yes  -KG    Pre-Treatment Pain Level 6  -KG    Post-Treatment Pain Level 5  -KG       Posture/Observations    Posture/Observations Comments No acute distress. Non-antalgic gait. ASIS/PSIS appear equal but R PSIS/IS deep on R.  -KG      User Key  (r) = " Recorded By, (t) = Taken By, (c) = Cosigned By    Initials Name Provider Type    Kaleigh Rizvi, PT Physical Therapist                            PT Assessment/Plan     Row Name 09/12/18 1700          PT Assessment    Functional Limitations Limitation in home management;Limitations in community activities;Limitations in functional capacity and performance;Performance in leisure activities;Performance in self-care ADL;Performance in work activities  -KG     Impairments Impaired flexibility;Impaired muscle endurance;Joint mobility;Muscle strength;Pain;Posture;Range of motion  -KG     Assessment Comments Pt's alignment somewhat improved this date. Continues to demonstrate R PSIS/IS deep on R. Overall good tolerance to therex this date without increased pain. Pt continues to demonstrate significant weakness in core & hips/glutes but improving. Discuss with pt about possible dry needling in future visits. R piriformis very TTP. Discussed with pt about deferring HL hip abd & prone heel squeezes if aggrevating symptoms.  -KG     Rehab Potential Good  -KG     Patient/caregiver participated in establishment of treatment plan and goals Yes  -KG     Patient would benefit from skilled therapy intervention Yes  -KG        PT Plan    PT Frequency 2x/week  -KG     Predicted Duration of Therapy Intervention (Therapy Eval) 4-6 weeks  -KG     PT Plan Comments Continue to monitor alignment. Continue manual activities. Progress core stabilization as tolerated.  -KG       User Key  (r) = Recorded By, (t) = Taken By, (c) = Cosigned By    Initials Name Provider Type    Kaleigh Rizvi, PT Physical Therapist                Modalities     Row Name 09/12/18 1600             Ice    Patient denies application of Ice Yes  -KG      Patient reports will apply ice at home to involved area Yes  -KG        User Key  (r) = Recorded By, (t) = Taken By, (c) = Cosigned By    Initials Name Provider Type    Kaleigh Rizvi, PT Physical  "Therapist                Exercises     Row Name 09/12/18 1700 09/12/18 1600          Precautions    Existing Precautions/Restrictions  -- no known precautions/restrictions  -KG        Subjective Comments    Subjective Comments  -- Pt states she's not having any numbness in R leg today but her post hip/low back feels aggrevated. States it feels better to stand today than sit. States she felt a little better over the weekend. States she things the prone heel squeezes and HL hip abd are aggrevating things.  -KG        Subjective Pain    Able to rate subjective pain?  -- yes  -KG     Pre-Treatment Pain Level  -- 6  -KG     Post-Treatment Pain Level  -- 5  -KG        Aquatics    Aquatics performed?  -- No  -KG        Exercise 1    Exercise Name 1 Standing hamstring stretch  -KG  --     Reps 1 2  -KG  --     Time 1 30\"  -KG  --        Exercise 2    Exercise Name 2 Supine piriformis stretcg  -KG  --     Reps 2 2  -KG  --     Time 2 30\" hold  -KG  --        Exercise 3    Exercise Name 3 Supine hip flexor/pamela stretch  -KG  --     Cueing 3 Verbal  -KG  --     Reps 3 2  -KG  --     Time 3 30\" hold  -KG  --        Exercise 4    Exercise Name 4 HL TA dead bugs  -KG  --     Cueing 4 Verbal  -KG  --     Sets 4 2  -KG  --     Reps 4 10  -KG  --        Exercise 5    Exercise Name 5 Alignment check/manual; see manual section for details.  -KG  --        Exercise 6    Exercise Name 6 Prone hip IR  -KG  --     Cueing 6 Verbal  -KG  --     Sets 6 2  -KG  --     Reps 6 10  -KG  --     Additional Comments Yellow tband  -KG  --        Exercise 7    Exercise Name 7 Prone TKE/glute squeeze  -KG  --     Cueing 7 Verbal  -KG  --     Sets 7 1  -KG  --     Reps 7 10  -KG  --        Exercise 8    Exercise Name 8 Seated PN + LAQ  -KG  --     Cueing 8 Verbal  -KG  --     Sets 8 2  -KG  --     Reps 8 10  -KG  --       User Key  (r) = Recorded By, (t) = Taken By, (c) = Cosigned By    Initials Name Provider Type    KG Kaleigh Jim, PT Physical " Therapist                        Manual Rx (last 36 hours)      Manual Treatments     Row Name 09/12/18 1700             Manual Rx 1    Manual Rx 1 Location L sacral springing/mobilization  -KG      Manual Rx 1 Duration 3 min  -KG         Manual Rx 2    Manual Rx 2 Location R piriformis STM/MFR  -KG      Manual Rx 2 Duration 5 min  -KG        User Key  (r) = Recorded By, (t) = Taken By, (c) = Cosigned By    Initials Name Provider Type    KG Kaleigh Jim, PT Physical Therapist                PT OP Goals     Row Name 09/12/18 1700          PT Short Term Goals    STG Date to Achieve 09/17/18  -KG     STG 1 Independent/compliant with HEP  -KG     STG 1 Progress Met;Ongoing  -KG     STG 2 Tolerate 45 minute treatment session without increased pain  -KG     STG 2 Progress Progressing  -KG     STG 3 Demonstrate independence in isometric TrA activities throughout treatment session  -KG     STG 3 Progress Partially Met  -KG     STG 4 Demonstrate bilateral hip abd MMT to 4-/5  -KG     STG 4 Progress Progressing  -KG        Long Term Goals    LTG Date to Achieve 10/01/18  -KG     LTG 1 Subjectively report 60% improvement or greater  -KG     LTG 1 Progress Progressing  -KG     LTG 2 Improve modified oswestry score to 25% or less  -KG     LTG 2 Progress Progressing  -KG     LTG 3 Demonstrate bilateral lumbar SB AROM WNL without increased pain  -KG     LTG 3 Progress Progressing  -KG     LTG 4 Demonstrate bilateral hip flex MMT to 4+/5  -KG     LTG 4 Progress Progressing  -KG     LTG 5 Demonstrate bilateral hip abd MM to 4+/5  -KG     LTG 5 Progress Progressing  -KG     LTG 6 Perform 10-15 minutes of standing therex without increased pain/RLE symptoms  -KG     LTG 6 Progress Progressing  -KG        Time Calculation    PT Goal Re-Cert Due Date 09/17/18  -KG       User Key  (r) = Recorded By, (t) = Taken By, (c) = Cosigned By    Initials Name Provider Type    KG Kaleigh Jim, PT Physical Therapist          Therapy  Education  Education Details: Discontinue HL hip abd & prone heel squeezes. Added HL dead bug TA  Given: HEP, Symptoms/condition management, Pain management, Posture/body mechanics  Program: Modified, Progressed  How Provided: Verbal, Demonstration, Written  Provided to: Patient  Level of Understanding: Teach back education performed, Verbalized, Demonstrated              Time Calculation:   Start Time: 1650  Stop Time: 1733  Time Calculation (min): 43 min  Total Timed Code Minutes- PT: 43 minute(s)  Therapy Suggested Charges     Code   Minutes Charges    None           Therapy Charges for Today     Code Description Service Date Service Provider Modifiers Qty    95544876420 HC PT THER PROC EA 15 MIN 9/12/2018 Kaleigh Jim, PT GP 2    99019196429 HC PT MANUAL THERAPY EA 15 MIN 9/12/2018 Kaleigh Jim, PT GP 1                    Kaleigh Jim, PT  9/12/2018

## 2018-09-18 ENCOUNTER — HOSPITAL ENCOUNTER (OUTPATIENT)
Dept: PHYSICAL THERAPY | Facility: HOSPITAL | Age: 48
Setting detail: THERAPIES SERIES
Discharge: HOME | End: 2018-09-18

## 2018-09-18 DIAGNOSIS — M54.5 LOW BACK PAIN, UNSPECIFIED BACK PAIN LATERALITY, UNSPECIFIED CHRONICITY, WITH SCIATICA PRESENCE UNSPECIFIED: Primary | ICD-10-CM

## 2018-09-18 PROCEDURE — 97140 MANUAL THERAPY 1/> REGIONS: CPT

## 2018-09-18 PROCEDURE — 97110 THERAPEUTIC EXERCISES: CPT

## 2018-09-18 NOTE — THERAPY TREATMENT NOTE
"    Outpatient Physical Therapy Ortho Treatment Note  [facility]     Patient Name: Vanessa Dunaway  : 1970  MRN: [id number]  Today's Date: 2018      Visit Date: 2018  Subjective Improvement:  \"really unsure\"     Attendance:   Approved:   30 visits        MD follow up:    CLAUDIA        date:     18     Visit Dx:    ICD-10-CM ICD-9-CM   1. Low back pain, unspecified back pain laterality, unspecified chronicity, with sciatica presence unspecified M54.5 724.2       There is no problem list on file for this patient.       Past Medical History:   Diagnosis Date   • Frequent menstruation    • Menstruation, excessive    • Mitral valve prolapse    • Palpitations    • PONV (postoperative nausea and vomiting)     DIFFICULTY WAKING UP        Past Surgical History:   Procedure Laterality Date   • BREAST BIOPSY Bilateral    • CERVICAL LAMINECTOMY     • D&C HYSTEROSCOPY ENDOMETRIAL ABLATION N/A 2017    Procedure: DILATATION AND CURETTAGE HYSTEROSCOPY NOVASURE ENDOMETRIAL ABLATION;  Surgeon: Lesvia Bermudez DO;  Location: [facility] OR;  Service:    • LASIK               PT Ortho     Row Name 18 1600       Subjective Comments    Subjective Comments Pt states she rested poorly b/c it bothered her and it has been nagging today; drive over here caused some incr pn as well. States she and PT determined to hold clam and prone SI excs.  -PM       Precautions and Contraindications    Precautions/Limitations no known precautions/limitations  -PM    Contraindications LATEX ALLERGY  -PM       Subjective Pain    Able to rate subjective pain? yes  -PM    Pre-Treatment Pain Level 5  -PM    Post-Treatment Pain Level 4   3-4  -PM       Posture/Observations    Alignment Options --   R ASIS slight down and LLD (slight longer), impr MET  -PM    Posture/Observations Comments No acute distress. Non-antalgic gait. ASIS/PSIS appear equal but R PSIS/IS deep on R.  -PM      User Key  (r) = Recorded " "By, (t) = Taken By, (c) = Cosigned By    Initials Name Provider Type    PM Rajni Bone PTA Physical Therapy Assistant                            PT Assessment/Plan     Row Name 09/18/18 1600          PT Assessment    Assessment Comments Pt alignment not far off today and corrected with MET; muscle tension lumbar and piriformis R > L. Weak iso TA, improved with util of Kegal as well.  -PM     Rehab Potential Good  -PM     Patient/caregiver participated in establishment of treatment plan and goals Yes  -PM     Patient would benefit from skilled therapy intervention Yes  -PM        PT Plan    PT Frequency 2x/week  -PM     Predicted Duration of Therapy Intervention (Therapy Eval) 4-6 weeks  -PM     PT Plan Comments Needs 30 day sarina nxt wk with soft tissue needling; gentle core stability/manual  -PM       User Key  (r) = Recorded By, (t) = Taken By, (c) = Cosigned By    Initials Name Provider Type    Rajni Sosa PTA Physical Therapy Assistant                Modalities     Row Name 09/18/18 1600             Ice    Patient denies application of Ice Yes   has TENS and will use as well  -PM      Patient reports will apply ice at home to involved area Yes  -PM        User Key  (r) = Recorded By, (t) = Taken By, (c) = Cosigned By    Initials Name Provider Type    PM Rajni Bone PTA Physical Therapy Assistant                Exercises     Row Name 09/18/18 1600             Subjective Comments    Subjective Comments Pt states she rested poorly b/c it bothered her and it has been nagging today; drive over here caused some incr pn as well. States she and PT determined to hold clam and prone SI excs.  -PM         Subjective Pain    Able to rate subjective pain? yes  -PM      Pre-Treatment Pain Level 5  -PM      Post-Treatment Pain Level 4   3-4  -PM         Exercise 1    Exercise Name 1 seated HS S  -PM      Cueing 1 Verbal  -PM      Reps 1 2  -PM      Time 1 30\"  -PM         Exercise 2    Exercise " "Name 2 supine piriformis S, L2 and L3  -PM      Cueing 2 Verbal  -PM      Reps 2 2  -PM      Time 2 30\"  -PM         Exercise 3    Exercise Name 3 DKTC S  -PM      Cueing 3 Verbal  -PM      Reps 3 2  -PM      Time 3 30\"  -PM         Exercise 4    Exercise Name 4 HL TA/Kegal BKLL  -PM      Cueing 4 Verbal  -PM      Sets 4 1  -PM      Reps 4 15  -PM         Exercise 5    Exercise Name 5 bridge with iso TA/Kegal  -PM      Cueing 5 Verbal  -PM      Sets 5 1  -PM      Reps 5 15  -PM         Exercise 6    Exercise Name 6 supine hip flexor S (Yovani)  -PM      Cueing 6 Verbal  -PM      Reps 6 2  -PM      Time 6 30\"  -PM        User Key  (r) = Recorded By, (t) = Taken By, (c) = Cosigned By    Initials Name Provider Type    PM Rajni Bone PTA Physical Therapy Assistant                        Manual Rx (last 36 hours)      Manual Treatments     Row Name 09/18/18 1700             Manual Rx 1    Manual Rx 1 Location MET R IS (HS); long axis distraction B  -PM      Manual Rx 1 Duration 3 min  -PM         Manual Rx 2    Manual Rx 2 Location lumbosacral; piriformis  -PM      Manual Rx 2 Type MFR/MET  -PM      Manual Rx 2 Duration 8 min  -PM         Manual Rx 3    Manual Rx 3 Location sacral mobs  -PM      Manual Rx 3 Type PA springing, gentle  -PM      Manual Rx 3 Grade 3 min  -PM        User Key  (r) = Recorded By, (t) = Taken By, (c) = Cosigned By    Initials Name Provider Type    PM Rajni Bone PTA Physical Therapy Assistant                PT OP Goals     Row Name 09/18/18 1600          PT Short Term Goals    STG Date to Achieve 09/17/18  -PM     STG 1 Independent/compliant with HEP  -PM     STG 1 Progress Met;Ongoing  -PM     STG 2 Tolerate 45 minute treatment session without increased pain  -PM     STG 2 Progress Progressing  -PM     STG 3 Demonstrate independence in isometric TrA activities throughout treatment session  -PM     STG 3 Progress Partially Met  -PM     STG 4 Demonstrate bilateral hip abd MMT to " 4-/5  -PM     STG 4 Progress Progressing  -PM        Long Term Goals    LTG Date to Achieve 10/01/18  -PM     LTG 1 Subjectively report 60% improvement or greater  -PM     LTG 1 Progress Progressing  -PM     LTG 2 Improve modified oswestry score to 25% or less  -PM     LTG 2 Progress Progressing  -PM     LTG 3 Demonstrate bilateral lumbar SB AROM WNL without increased pain  -PM     LTG 3 Progress Progressing  -PM     LTG 4 Demonstrate bilateral hip flex MMT to 4+/5  -PM     LTG 4 Progress Progressing  -PM     LTG 5 Demonstrate bilateral hip abd MM to 4+/5  -PM     LTG 5 Progress Progressing  -PM     LTG 6 Perform 10-15 minutes of standing therex without increased pain/RLE symptoms  -PM     LTG 6 Progress Progressing  -PM        Time Calculation    PT Goal Re-Cert Due Date 09/17/18  -PM       User Key  (r) = Recorded By, (t) = Taken By, (c) = Cosigned By    Initials Name Provider Type    PM Rajni Bone PTA Physical Therapy Assistant                         Time Calculation:   Start Time: 1655  Stop Time: 1740  Time Calculation (min): 45 min  Total Timed Code Minutes- PT: 45 minute(s)  Therapy Suggested Charges     Code   Minutes Charges    None           Therapy Charges for Today     Code Description Service Date Service Provider Modifiers Qty    76381421456 HC PT THER PROC EA 15 MIN 9/18/2018 Rajni Bone PTA GP 2    68167018055 HC PT MANUAL THERAPY EA 15 MIN 9/18/2018 Rajni Bone PTA GP 1                    Rajni Bone PTA  9/18/2018

## 2018-09-20 ENCOUNTER — HOSPITAL ENCOUNTER (OUTPATIENT)
Dept: PHYSICAL THERAPY | Facility: HOSPITAL | Age: 48
Setting detail: THERAPIES SERIES
Discharge: HOME | End: 2018-09-20

## 2018-09-20 DIAGNOSIS — M54.5 LOW BACK PAIN, UNSPECIFIED BACK PAIN LATERALITY, UNSPECIFIED CHRONICITY, WITH SCIATICA PRESENCE UNSPECIFIED: Primary | ICD-10-CM

## 2018-09-20 PROCEDURE — 97110 THERAPEUTIC EXERCISES: CPT

## 2018-09-20 PROCEDURE — 97140 MANUAL THERAPY 1/> REGIONS: CPT

## 2018-09-20 NOTE — THERAPY TREATMENT NOTE
"    Outpatient Physical Therapy Ortho Treatment Note  [facility]       Patient Name: Vanessa Dunaway  : 1970  MRN: [id number]  Today's Date: 2018      Visit Date: 2018  Subjective Improvement:  \"really unsure\"     Attendance:   Approved:   30 visits        MD follow up:    CLAUDIA        date:     18  Visit Dx:    ICD-10-CM ICD-9-CM   1. Low back pain, unspecified back pain laterality, unspecified chronicity, with sciatica presence unspecified M54.5 724.2       There is no problem list on file for this patient.       Past Medical History:   Diagnosis Date   • Frequent menstruation    • Menstruation, excessive    • Mitral valve prolapse    • Palpitations    • PONV (postoperative nausea and vomiting)     DIFFICULTY WAKING UP        Past Surgical History:   Procedure Laterality Date   • BREAST BIOPSY Bilateral    • CERVICAL LAMINECTOMY     • D&C HYSTEROSCOPY ENDOMETRIAL ABLATION N/A 2017    Procedure: DILATATION AND CURETTAGE HYSTEROSCOPY NOVASURE ENDOMETRIAL ABLATION;  Surgeon: Lesvia Bermudez DO;  Location: [facility] OR;  Service:    • LASIK               PT Ortho     Row Name 18 1700       Precautions and Contraindications    Precautions/Limitations no known precautions/limitations  -PM    Contraindications LATEX ALLERGY  -PM       Posture/Observations    Alignment Options --   R ASIS slight down and LLD (slight longer), impr MET  -PM    Posture/Observations Comments No acute distress. Non-antalgic gait. ASIS/PSIS appear equal but R PSIS/IS deep on R.  -PM    Row Name 18 1600       Subjective Comments    Subjective Comments Pt states she rested poorly b/c it bothered her and it has been nagging today; drive over here caused some incr pn as well. States she and PT determined to hold clam and prone SI excs.  -PM       Precautions and Contraindications    Precautions/Limitations no known precautions/limitations  -PM    Contraindications LATEX ALLERGY  -PM " "      Subjective Pain    Able to rate subjective pain? yes  -PM    Pre-Treatment Pain Level 5  -PM    Post-Treatment Pain Level 4   3-4  -PM       Posture/Observations    Alignment Options --   R ASIS slight down and LLD (slight longer), impr MET  -PM    Posture/Observations Comments No acute distress. Non-antalgic gait. ASIS/PSIS appear equal but R PSIS/IS deep on R.  -PM      User Key  (r) = Recorded By, (t) = Taken By, (c) = Cosigned By    Initials Name Provider Type    Rajni Sosa PTA Physical Therapy Assistant                            PT Assessment/Plan     Row Name 09/20/18 1700          PT Assessment    Assessment Comments LL =; IS= but sacral base slightly deeper on R and tender. Pt did well with marycarmen disc with cues but challenging.  -PM     Rehab Potential Good  -PM     Patient/caregiver participated in establishment of treatment plan and goals Yes  -PM     Patient would benefit from skilled therapy intervention Yes  -PM        PT Plan    PT Frequency 2x/week  -PM     Predicted Duration of Therapy Intervention (Therapy Eval) 4-6 wks  -PM     PT Plan Comments 30 sarina next wk with possible dry needling  -PM       User Key  (r) = Recorded By, (t) = Taken By, (c) = Cosigned By    Initials Name Provider Type    Rajni Sosa PTA Physical Therapy Assistant                    Exercises     Row Name 09/20/18 1700             Subjective Comments    Subjective Comments \"[place] Pt states her right sacral area; doesn't go down leg unless I've really taxed it.\" \"Stabbing pn\"  -PM         Subjective Pain    Able to rate subjective pain? yes  -PM      Pre-Treatment Pain Level 5  -PM      Post-Treatment Pain Level 5  -PM         Exercise 1    Exercise Name 1 std HS S  -PM      Cueing 1 Verbal  -PM      Reps 1 2  -PM      Time 1 30\"  -PM         Exercise 2    Exercise Name 2 seated piriformis S, L2 and L3  -PM      Cueing 2 Verbal  -PM      Reps 2 2  -PM      Time 2 " "30\"  -PM         Exercise 3    Exercise Name 3 DKTC S  -PM      Cueing 3 Verbal  -PM      Reps 3 2  -PM      Time 3 30\"  -PM         Exercise 4    Exercise Name 4 HL TA/Kegal BKLL  -PM      Cueing 4 Verbal  -PM      Sets 4 1  -PM      Reps 4 15  -PM         Exercise 5    Exercise Name 5 bridge with iso TA/Kegal  -PM      Cueing 5 Verbal  -PM      Sets 5 1  -PM      Reps 5 5  -PM      Additional Comments review  -PM         Exercise 6    Exercise Name 6 supine hip flexor S (Yovani)  -PM      Cueing 6 Verbal  -PM      Reps 6 2  -PM      Time 6 30\"  -PM      Additional Comments showed kneeling S as well  -PM         Exercise 7    Exercise Name 7 mid back stretch  -PM      Cueing 7 Verbal  -PM      Sets 7 1  -PM      Time 7 30\"  -PM         Exercise 8    Exercise Name 8 Anum Disc PN TA LAQ  -PM      Cueing 8 Verbal;Tactile  -PM      Sets 8 2  -PM      Reps 8 10  -PM         Exercise 9    Exercise Name 9 Anum Disc PN TA gentle march  -PM      Cueing 9 Verbal  -PM      Sets 9 2  -PM      Reps 9 10  -PM         Exercise 10    Exercise Name 10 verb review SL clam and prone over pillow IR  -PM        User Key  (r) = Recorded By, (t) = Taken By, (c) = Cosigned By    Initials Name Provider Type    PM Rajni Bone PTA Physical Therapy Assistant                        Manual Rx (last 36 hours)      Manual Treatments     Row Name 09/20/18 1700             Manual Rx 1    Manual Rx 1 Location SI   std R base deeper; = after MET  -PM      Manual Rx 1 Type SL MET  -PM      Manual Rx 1 Duration 1'  -PM         Manual Rx 2    Manual Rx 2 Location lumbosacral; piriformis  -PM      Manual Rx 2 Type MFR  -PM      Manual Rx 2 Duration 8 min  -PM         Manual Rx 3    Manual Rx 3 Location sacral mobs  -PM      Manual Rx 3 Type PA springing gentle  -PM      Manual Rx 3 Grade 2 min  -PM        User Key  (r) = Recorded By, (t) = Taken By, (c) = Cosigned By    Initials Name Provider Type    PM Rajni Bone, SOLO Physical Therapy " Assistant                PT OP Goals     Row Name 09/20/18 1700          PT Short Term Goals    STG Date to Achieve 09/17/18  -PM     STG 1 Independent/compliant with HEP  -PM     STG 1 Progress Met;Ongoing  -PM     STG 2 Tolerate 45 minute treatment session without increased pain  -PM     STG 2 Progress Progressing  -PM     STG 3 Demonstrate independence in isometric TrA activities throughout treatment session  -PM     STG 3 Progress Partially Met  -PM     STG 4 Demonstrate bilateral hip abd MMT to 4-/5  -PM     STG 4 Progress Progressing  -PM        Long Term Goals    LTG Date to Achieve 10/01/18  -PM     LTG 1 Subjectively report 60% improvement or greater  -PM     LTG 1 Progress Progressing  -PM     LTG 2 Improve modified oswestry score to 25% or less  -PM     LTG 2 Progress Progressing  -PM     LTG 3 Demonstrate bilateral lumbar SB AROM WNL without increased pain  -PM     LTG 3 Progress Progressing  -PM     LTG 4 Demonstrate bilateral hip flex MMT to 4+/5  -PM     LTG 4 Progress Progressing  -PM     LTG 5 Demonstrate bilateral hip abd MM to 4+/5  -PM     LTG 5 Progress Progressing  -PM     LTG 6 Perform 10-15 minutes of standing therex without increased pain/RLE symptoms  -PM     LTG 6 Progress Progressing  -PM        Time Calculation    PT Goal Re-Cert Due Date 09/17/18  -PM       User Key  (r) = Recorded By, (t) = Taken By, (c) = Cosigned By    Initials Name Provider Type    Rajni Sosa PTA Physical Therapy Assistant          Therapy Education  Given: HEP, Symptoms/condition management, Pain management, Posture/body mechanics  Program: Reinforced  How Provided: Verbal, Demonstration, Written  Provided to: Patient  Level of Understanding: Teach back education performed, Verbalized, Demonstrated              Time Calculation:   Start Time: 1702  Stop Time: 1752  Time Calculation (min): 50 min  Total Timed Code Minutes- PT: 50 minute(s)  Therapy Suggested Charges     Code   Minutes Charges    None            Therapy Charges for Today     Code Description Service Date Service Provider Modifiers Qty    06815746573 HC PT THER PROC EA 15 MIN 9/20/2018 Rajni Bone, SOLO GP 2    95924819231 HC PT MANUAL THERAPY EA 15 MIN 9/20/2018 Rajni Bone PTA GP 1                    Rajni Bone, SOLO  9/20/2018

## 2018-09-24 ENCOUNTER — HOSPITAL ENCOUNTER (OUTPATIENT)
Dept: PHYSICAL THERAPY | Facility: HOSPITAL | Age: 48
Setting detail: THERAPIES SERIES
Discharge: HOME | End: 2018-09-24

## 2018-09-24 DIAGNOSIS — M54.5 LOW BACK PAIN, UNSPECIFIED BACK PAIN LATERALITY, UNSPECIFIED CHRONICITY, WITH SCIATICA PRESENCE UNSPECIFIED: Primary | ICD-10-CM

## 2018-09-24 PROCEDURE — 97140 MANUAL THERAPY 1/> REGIONS: CPT | Performed by: PHYSICAL THERAPIST

## 2018-09-24 PROCEDURE — 97110 THERAPEUTIC EXERCISES: CPT | Performed by: PHYSICAL THERAPIST

## 2018-09-24 NOTE — THERAPY PROGRESS REPORT/RE-CERT
Outpatient Physical Therapy Ortho Progress Note  [facility]     Patient Name: Vanessa Dunaway  : 1970  MRN: [id number]  Today's Date: 2018      Visit Date: 2018     Subjective Improvement:   40-45%    Attendance:   Approved:       30 visits    MD follow up:      CLAUDIA VALENZUELA date:     10/15/18      There is no problem list on file for this patient.       Past Medical History:   Diagnosis Date   • Frequent menstruation    • Menstruation, excessive    • Mitral valve prolapse    • Palpitations    • PONV (postoperative nausea and vomiting)     DIFFICULTY WAKING UP        Past Surgical History:   Procedure Laterality Date   • BREAST BIOPSY Bilateral    • CERVICAL LAMINECTOMY     • D&C HYSTEROSCOPY ENDOMETRIAL ABLATION N/A 2017    Procedure: DILATATION AND CURETTAGE HYSTEROSCOPY NOVASURE ENDOMETRIAL ABLATION;  Surgeon: Lesvia Bermudez DO;  Location[facility] OR;  Service:    • LASIK         Visit Dx:     ICD-10-CM ICD-9-CM   1. Low back pain, unspecified back pain laterality, unspecified chronicity, with sciatica presence unspecified M54.5 724.2                 PT Ortho     Row Name 18 1700       Precautions and Contraindications    Precautions/Limitations no known precautions/limitations  -KG    Contraindications LATEX ALLERGY  -KG       Subjective Pain    Post-Treatment Pain Level 2  -KG       Posture/Observations    Posture/Observations Comments No acute distress. Ambulates with non-antalgic. ASIS & PSIS appear equal bilaterally. Slightly deep PSIS on R vs L; does improve with manual  -KG       Neural Tension Signs- Lower Quarter Clearing    Slump Right:;Positive  -KG    SLR Bilateral:;Negative  -KG       Sensory Screen for Light Touch- Lower Quarter Clearing    L1 (inguinal area) Bilateral:;Intact  -KG    L2 (anterior mid thigh) Bilateral:;Intact  -KG    L3 (distal anterior thigh) Bilateral:;Intact  -KG    L4 (medial lower leg/foot) Bilateral:;Intact  -KG    L5  (lateral lower leg/great toe) Bilateral:;Intact  -KG    S1 (bottom of foot) Bilateral:;Intact  -KG       Lumbar ROM Screen- Lower Quarter Clearing    Lumbar Flexion Normal  -KG    Lumbar Extension Normal  -KG    Lumbar Lateral Flexion Normal   Pain R low back with bilateral SB  -KG       Special Tests/Palpation    Special Tests/Palpation Lumbar/SI  -KG       Lumbosacral Palpation    SI Right:;Tender  -KG    Spinous Process Right:;Tender   L4-L5; as well as R TP's  -KG    Piriformis Right:;Tender;Guarded/taut  -KG    Coccyx Tender   [place] of sacrum  -KG       MMT Right Lower Ext    Rt Hip Flexion MMT, Gross Movement (4/5) good  -KG    Rt Hip ABduction MMT, Gross Movement (4-/5) good minus  -KG    Rt Knee Extension MMT, Gross Movement (4/5) good  -KG    Rt Knee Flexion MMT, Gross Movement (4/5) good  -KG    Rt Ankle Dorsiflexion MMT, Gross Movement (5/5) normal  -KG       MMT Left Lower Ext    Lt Hip Flexion MMT, Gross Movement (4+/5) good plus  -KG    Lt Hip ABduction MMT, Gross Movement (4-/5) good minus  -KG    Lt Hip ADduction MMT, Gross Movement (4+/5) good plus  -KG    Lt Knee Extension MMT, Gross Movement (4+/5) good plus  -KG    Lt Knee Flexion MMT, Gross Movement (4+/5) good plus  -KG    Lt Ankle Dorsiflexion MMT, Gross Movement (5/5) normal  -KG       Lower Extremity Flexibility    Hamstrings Bilateral:;Mildly limited  -KG    Hip Flexors Right:;Mildly limited  -KG    Hip External Rotators Right:;Mildly limited  -KG      User Key  (r) = Recorded By, (t) = Taken By, (c) = Cosigned By    Initials Name Provider Type    KG Kaleigh Jim, PT Physical Therapist                      Therapy Education  Given: HEP, Symptoms/condition management, Posture/body mechanics  Program: Reinforced  How Provided: Verbal  Provided to: Patient  Level of Understanding: Verbalized, Demonstrated           PT OP Goals     Row Name 09/24/18 1700          PT Short Term Goals    STG Date to Achieve 10/08/18  -KG     STG 1  Independent/compliant with HEP  -KG     STG 1 Progress Met;Ongoing  -KG     STG 2 Tolerate 45 minute treatment session without increased pain  -KG     STG 2 Progress Partially Met;Ongoing  -KG     STG 3 Demonstrate independence in isometric TrA activities throughout treatment session  -KG     STG 3 Progress Partially Met  -KG     STG 4 Demonstrate bilateral hip abd MMT to 4-/5  -KG     STG 4 Progress Met  -KG        Long Term Goals    LTG Date to Achieve 10/22/18  -KG     LTG 1 Subjectively report 60% improvement or greater  -KG     LTG 1 Progress Progressing  -KG     LTG 2 Improve modified oswestry score to 25% or less  -KG     LTG 2 Progress Progressing  -KG     LTG 3 Demonstrate bilateral lumbar SB AROM WNL without increased pain  -KG     LTG 3 Progress Progressing  -KG     LTG 4 Demonstrate bilateral hip flex MMT to 4+/5  -KG     LTG 4 Progress Progressing  -KG     LTG 5 Demonstrate bilateral hip abd MM to 4+/5  -KG     LTG 5 Progress Progressing  -KG     LTG 6 Perform 10-15 minutes of standing therex without increased pain/RLE symptoms  -KG     LTG 6 Progress Progressing  -KG        Time Calculation    PT Goal Re-Cert Due Date 10/15/18  -KG       User Key  (r) = Recorded By, (t) = Taken By, (c) = Cosigned By    Initials Name Provider Type    KG Kaleigh Jim, PT Physical Therapist                PT Assessment/Plan     Row Name 09/24/18 1700          PT Assessment    Functional Limitations Limitation in home management;Limitations in community activities;Limitations in functional capacity and performance;Performance in leisure activities;Performance in self-care ADL;Performance in work activities  -KG     Impairments Impaired flexibility;Impaired muscle endurance;Joint mobility;Muscle strength;Pain;Posture;Range of motion  -KG     Assessment Comments Pt progressing well but slowly with PT at this time. Pt subjectively reports 40-45% overall improvement. Positive neurotension signs still present. Slight  improvements noted in overall hip strength but still limited/weak. Core/iso TrA weakness still notable but improved performance dynadisc activities. Possible SI dysfuncition component present as well. Good response to TPDN noted at piriformis with improved symptoms/pain. Pt will continue to benefit from skilled PT services to further improve functional core stability and strength to facilitate return to PLOF.  -KG     Rehab Potential Good  -KG     Patient/caregiver participated in establishment of treatment plan and goals Yes  -KG     Patient would benefit from skilled therapy intervention Yes  -KG        PT Plan    PT Frequency 2x/week  -KG     Predicted Duration of Therapy Intervention (Therapy Eval) 3-4 weeks  -KG     PT Plan Comments Monitor response to TPDN; continue to progress core stabilization activities; continue manual.  -KG       User Key  (r) = Recorded By, (t) = Taken By, (c) = Cosigned By    Initials Name Provider Type    Kaleigh Rizvi, PT Physical Therapist                Modalities     Row Name 09/24/18 1700             Precautions    Existing Precautions/Restrictions no known precautions/restrictions  -KG         Ice    Patient denies application of Ice Yes  -KG      Patient reports will apply ice at home to involved area Yes  -KG        User Key  (r) = Recorded By, (t) = Taken By, (c) = Cosigned By    Initials Name Provider Type    Kaleigh Rizvi, PT Physical Therapist              Exercises     Row Name 09/24/18 1700             Precautions    Existing Precautions/Restrictions no known precautions/restrictions  -KG         Subjective Comments    Subjective Comments Pt reports 40-45% improvement overall. States the numbness in her RLE is better. States transfers better - can roll over now with intense pain. Still feels weak in core & hips. Still having pain but less intense.  -KG         Subjective Pain    Able to rate subjective pain? yes  -KG      Pre-Treatment Pain Level 3  -KG    "   Post-Treatment Pain Level 2  -KG         Exercise 1    Exercise Name 1 std HS S  -KG      Cueing 1 Verbal  -KG      Reps 1 2  -KG      Time 1 30\"  -KG         Exercise 2    Exercise Name 2 Anum Disc PN TA LAQ  -KG      Cueing 2 Verbal  -KG      Sets 2 2  -KG      Reps 2 10  -KG         Exercise 3    Exercise Name 3 Anum Disc PN TA gentle march  -KG      Cueing 3 Verbal  -KG      Sets 3 2  -KG      Reps 3 10  -KG         Exercise 4    Exercise Name 4 HL TA/Kegal BKLL  -KG      Cueing 4 Verbal  -KG      Sets 4 1  -KG      Reps 4 15  -KG         Exercise 5    Exercise Name 5 supine hip flexor S (Yovani)  -KG      Cueing 5 Verbal  -KG      Reps 5 2  -KG      Time 5 30\" hold  -KG         Exercise 6    Exercise Name 6 Manual  -KG         Exercise 7    Exercise Name 7 bridge with iso TA/Kegal  -KG      Cueing 7 Verbal  -KG      Sets 7 2  -KG      Reps 7 10  -KG      Time 7 5\" hold  -KG        User Key  (r) = Recorded By, (t) = Taken By, (c) = Cosigned By    Initials Name Provider Type    Kaleigh Rizvi, PT Physical Therapist           Manual Rx (last 36 hours)      Manual Treatments     Row Name 09/24/18 1700             Manual Rx 1    Manual Rx 1 Location TPDN R piriformis  -KG      Manual Rx 1 Duration 6 min  -KG         Manual Rx 2    Manual Rx 2 Location lumbosacral; piriformis  -KG      Manual Rx 2 Type MFR  -KG      Manual Rx 2 Duration 8 min  -KG         Manual Rx 3    Manual Rx 3 Location sacral mobs  -KG      Manual Rx 3 Type PA springing gentle  -KG      Manual Rx 3 Grade 3 min  -KG        User Key  (r) = Recorded By, (t) = Taken By, (c) = Cosigned By    Initials Name Provider Type    Kaleigh Rizvi, PT Physical Therapist                      Outcome Measure Options: Modifed Owestry  Modified Oswestry  Modified Oswestry Score/Comments: 38%      Time Calculation:     Therapy Suggested Charges     Code   Minutes Charges    None             Start Time: 1656  Stop Time: 1747  Time Calculation (min): 51 " min  Total Timed Code Minutes- PT: 51 minute(s)     Therapy Charges for Today     Code Description Service Date Service Provider Modifiers Qty    79644841680 HC PT THER PROC EA 15 MIN 9/24/2018 Kaleigh Jim, PT GP 2    33411119739 HC PT MANUAL THERAPY EA 15 MIN 9/24/2018 Kaleigh Jim, PT GP 1          PT G-Codes  Outcome Measure Options: Modifed Owestry  Modified Oswestry Score/Comments: 38%         Kaleigh Jim, PT  9/24/2018

## 2018-09-26 ENCOUNTER — HOSPITAL ENCOUNTER (OUTPATIENT)
Dept: PHYSICAL THERAPY | Facility: HOSPITAL | Age: 48
Setting detail: THERAPIES SERIES
Discharge: HOME | End: 2018-09-26

## 2018-09-26 DIAGNOSIS — M54.5 LOW BACK PAIN, UNSPECIFIED BACK PAIN LATERALITY, UNSPECIFIED CHRONICITY, WITH SCIATICA PRESENCE UNSPECIFIED: Primary | ICD-10-CM

## 2018-09-26 PROCEDURE — 97140 MANUAL THERAPY 1/> REGIONS: CPT | Performed by: PHYSICAL THERAPIST

## 2018-09-26 PROCEDURE — 97110 THERAPEUTIC EXERCISES: CPT | Performed by: PHYSICAL THERAPIST

## 2018-09-26 NOTE — THERAPY TREATMENT NOTE
Outpatient Physical Therapy Ortho Treatment Note  [facility]     Patient Name: Vanessa Dunaway  : 1970  MRN: [id number]  Today's Date: 2018      Visit Date: 2018     Subjective Improvement:  40-45%     Attendance:   Approved:   30 visits        MD follow up:    CLAUDIA VALENZUELA date:     10/15/18      Visit Dx:    ICD-10-CM ICD-9-CM   1. Low back pain, unspecified back pain laterality, unspecified chronicity, with sciatica presence unspecified M54.5 724.2       There is no problem list on file for this patient.       Past Medical History:   Diagnosis Date   • Frequent menstruation    • Menstruation, excessive    • Mitral valve prolapse    • Palpitations    • PONV (postoperative nausea and vomiting)     DIFFICULTY WAKING UP        Past Surgical History:   Procedure Laterality Date   • BREAST BIOPSY Bilateral    • CERVICAL LAMINECTOMY     • D&C HYSTEROSCOPY ENDOMETRIAL ABLATION N/A 2017    Procedure: DILATATION AND CURETTAGE HYSTEROSCOPY NOVASURE ENDOMETRIAL ABLATION;  Surgeon: Lesvia Bermudez DO;  Location: [facility] OR;  Service:    • LASIK               PT Ortho     Row Name 18 1700       Precautions and Contraindications    Precautions/Limitations no known precautions/limitations  -KG    Contraindications LATEX ALLERGY  -KG       Posture/Observations    Posture/Observations Comments No acute distress. ASIS equal again this date but PSIS deep on Right  -KG    Row Name 18 1700       Precautions and Contraindications    Precautions/Limitations no known precautions/limitations  -KG    Contraindications LATEX ALLERGY  -KG       Subjective Pain    Post-Treatment Pain Level 2  -KG       Posture/Observations    Posture/Observations Comments No acute distress. Ambulates with non-antalgic. ASIS & PSIS appear equal bilaterally. Slightly deep PSIS on R vs L; does improve with manual  -KG       Neural Tension Signs- Lower Quarter Clearing    Slump Right:;Positive  -KG     SLR Bilateral:;Negative  -KG       Sensory Screen for Light Touch- Lower Quarter Clearing    L1 (inguinal area) Bilateral:;Intact  -KG    L2 (anterior mid thigh) Bilateral:;Intact  -KG    L3 (distal anterior thigh) Bilateral:;Intact  -KG    L4 (medial lower leg/foot) Bilateral:;Intact  -KG    L5 (lateral lower leg/great toe) Bilateral:;Intact  -KG    S1 (bottom of foot) Bilateral:;Intact  -KG       Lumbar ROM Screen- Lower Quarter Clearing    Lumbar Flexion Normal  -KG    Lumbar Extension Normal  -KG    Lumbar Lateral Flexion Normal   Pain R low back with bilateral SB  -KG       Special Tests/Palpation    Special Tests/Palpation Lumbar/SI  -KG       Lumbosacral Palpation    SI Right:;Tender  -KG    Spinous Process Right:;Tender   L4-L5; as well as R TP's  -KG    Piriformis Right:;Tender;Guarded/taut  -KG    Coccyx Tender   [place] of sacrum  -KG       MMT Right Lower Ext    Rt Hip Flexion MMT, Gross Movement (4/5) good  -KG    Rt Hip ABduction MMT, Gross Movement (4-/5) good minus  -KG    Rt Knee Extension MMT, Gross Movement (4/5) good  -KG    Rt Knee Flexion MMT, Gross Movement (4/5) good  -KG    Rt Ankle Dorsiflexion MMT, Gross Movement (5/5) normal  -KG       MMT Left Lower Ext    Lt Hip Flexion MMT, Gross Movement (4+/5) good plus  -KG    Lt Hip ABduction MMT, Gross Movement (4-/5) good minus  -KG    Lt Hip ADduction MMT, Gross Movement (4+/5) good plus  -KG    Lt Knee Extension MMT, Gross Movement (4+/5) good plus  -KG    Lt Knee Flexion MMT, Gross Movement (4+/5) good plus  -KG    Lt Ankle Dorsiflexion MMT, Gross Movement (5/5) normal  -KG       Lower Extremity Flexibility    Hamstrings Bilateral:;Mildly limited  -KG    Hip Flexors Right:;Mildly limited  -KG    Hip External Rotators Right:;Mildly limited  -KG      User Key  (r) = Recorded By, (t) = Taken By, (c) = Cosigned By    Initials Name Provider Type    Kaleigh Rizvi, PT Physical Therapist                            PT Assessment/Plan     Row  "Name 09/26/18 1700          PT Assessment    Functional Limitations Limitation in home management;Limitations in community activities;Limitations in functional capacity and performance;Performance in leisure activities;Performance in self-care ADL;Performance in work activities  -KG     Impairments Impaired flexibility;Impaired muscle endurance;Joint mobility;Muscle strength;Pain;Posture;Range of motion  -KG     Assessment Comments Pt reports positive response from TPDN to R piriformis from previous visit. Less shifting at hips with BKLL with iso TrA activation this date, as core stabilization is improving.  -KG     Rehab Potential Good  -KG     Patient/caregiver participated in establishment of treatment plan and goals Yes  -KG     Patient would benefit from skilled therapy intervention Yes  -KG        PT Plan    PT Frequency 2x/week  -KG     Predicted Duration of Therapy Intervention (Therapy Eval) 3-4 weeks  -KG     PT Plan Comments Continue to progress seated core activities. Progress towards standing functional stabilization. TPDN next week.  -KG       User Key  (r) = Recorded By, (t) = Taken By, (c) = Cosigned By    Initials Name Provider Type    KG Kaleigh Jim, PT Physical Therapist                Modalities     Row Name 09/26/18 1700             Ice    Patient denies application of Ice Yes  -KG      Patient reports will apply ice at home to involved area Yes  -KG        User Key  (r) = Recorded By, (t) = Taken By, (c) = Cosigned By    Initials Name Provider Type    Kaleigh Rizvi, PT Physical Therapist                Exercises     Row Name 09/26/18 1700             Precautions    Existing Precautions/Restrictions no known precautions/restrictions  -KG         Subjective Comments    Subjective Comments Pt states the TPDN seemed to really help. States she doesn't \"feel like I've got a broomstick jabbing me in the back\" Pt states it's helped the catching in the her R side as well. Pt states she's a " "little sore today but pain on R side is better.  -KG         Subjective Pain    Able to rate subjective pain? yes  -KG      Pre-Treatment Pain Level 3  -KG      Post-Treatment Pain Level 2  -KG         Exercise 1    Exercise Name 1 Warm up walk outside  -KG      Time 1 5 min  -KG         Exercise 2    Exercise Name 2 Standing hamstring stretch  -KG      Cueing 2 Verbal  -KG      Reps 2 2  -KG      Time 2 30\" hold  -KG         Exercise 3    Exercise Name 3 Anum Disc PN TA gentle march  -KG      Cueing 3 Verbal  -KG      Sets 3 2  -KG      Reps 3 10  -KG         Exercise 4    Exercise Name 4 Dynad Disc PN TA LAQ  -KG      Cueing 4 Verbal  -KG      Sets 4 2  -KG      Reps 4 10  -KG         Exercise 5    Exercise Name 5 Anum disc PN TA mid rows  -KG      Cueing 5 Verbal  -KG      Sets 5 2  -KG      Reps 5 10  -KG      Additional Comments red tband  -KG         Exercise 6    Exercise Name 6 Dynadisc PN TA overhead raise  -KG      Cueing 6 Verbal  -KG      Sets 6 1  -KG      Reps 6 15  -KG      Additional Comments 1# bar  -KG         Exercise 7    Exercise Name 7 Standing hip abd + TA  -KG      Sets 7 1  -KG      Reps 7 10  -KG         Exercise 8    Exercise Name 8 Standing hip ext + TA  -KG      Cueing 8 Verbal  -KG      Sets 8 1  -KG      Reps 8 10  -KG         Exercise 9    Exercise Name 9 HL TA/Kegal BKLL with alt UE raise  -KG      Cueing 9 Verbal  -KG      Sets 9 2  -KG      Reps 9 10  -KG        User Key  (r) = Recorded By, (t) = Taken By, (c) = Cosigned By    Initials Name Provider Type    KG Kaleigh Jim, PT Physical Therapist                        Manual Rx (last 36 hours)      Manual Treatments     Row Name 09/26/18 1700             Manual Rx 2    Manual Rx 2 Location lumbosacral; piriformis  -KG      Manual Rx 2 Type MFR  -KG      Manual Rx 2 Duration 8 min  -KG         Manual Rx 3    Manual Rx 3 Location sacral mobs  -KG      Manual Rx 3 Type PA springing gentle  -KG      Manual Rx 3 Grade 3 min  -KG   "      User Key  (r) = Recorded By, (t) = Taken By, (c) = Cosigned By    Initials Name Provider Type    ROBERTA Kaleigh Jim, PT Physical Therapist                PT OP Goals     Row Name 09/26/18 1700          PT Short Term Goals    STG Date to Achieve 10/08/18  -KG     STG 1 Independent/compliant with HEP  -KG     STG 1 Progress Met;Ongoing  -KG     STG 2 Tolerate 45 minute treatment session without increased pain  -KG     STG 2 Progress Partially Met;Ongoing  -KG     STG 3 Demonstrate independence in isometric TrA activities throughout treatment session  -KG     STG 3 Progress Partially Met  -KG     STG 4 Demonstrate bilateral hip abd MMT to 4-/5  -KG     STG 4 Progress Met  -KG        Long Term Goals    LTG Date to Achieve 10/22/18  -KG     LTG 1 Subjectively report 60% improvement or greater  -KG     LTG 1 Progress Progressing  -KG     LTG 2 Improve modified oswestry score to 25% or less  -KG     LTG 2 Progress Progressing  -KG     LTG 3 Demonstrate bilateral lumbar SB AROM WNL without increased pain  -KG     LTG 3 Progress Progressing  -KG     LTG 4 Demonstrate bilateral hip flex MMT to 4+/5  -KG     LTG 4 Progress Progressing  -KG     LTG 5 Demonstrate bilateral hip abd MM to 4+/5  -KG     LTG 5 Progress Progressing  -KG     LTG 6 Perform 10-15 minutes of standing therex without increased pain/RLE symptoms  -KG     LTG 6 Progress Progressing  -KG        Time Calculation    PT Goal Re-Cert Due Date 10/15/18  -KG       User Key  (r) = Recorded By, (t) = Taken By, (c) = Cosigned By    Initials Name Provider Type    ROBERTA Kaleigh Jim, PT Physical Therapist          Therapy Education  Given: HEP, Symptoms/condition management, Posture/body mechanics  Program: Reinforced  How Provided: Verbal  Provided to: Patient  Level of Understanding: Verbalized, Demonstrated              Time Calculation:   Start Time: 1655  Stop Time: 1746  Time Calculation (min): 51 min  Total Timed Code Minutes- PT: 51 minute(s)  Therapy  Suggested Charges     Code   Minutes Charges    None           Therapy Charges for Today     Code Description Service Date Service Provider Modifiers Qty    56092489863 HC PT THER PROC EA 15 MIN 9/26/2018 Kaleigh Jim, PT GP 2    05152025155 HC PT MANUAL THERAPY EA 15 MIN 9/26/2018 Kaleigh Jim, PT GP 1                    Kaleigh Jim, PT  9/26/2018

## 2018-10-01 ENCOUNTER — APPOINTMENT (OUTPATIENT)
Dept: PHYSICAL THERAPY | Facility: HOSPITAL | Age: 48
End: 2018-10-01

## 2018-10-04 ENCOUNTER — HOSPITAL ENCOUNTER (OUTPATIENT)
Dept: PHYSICAL THERAPY | Facility: HOSPITAL | Age: 48
Setting detail: THERAPIES SERIES
Discharge: HOME | End: 2018-10-04

## 2018-10-04 DIAGNOSIS — M54.5 LOW BACK PAIN, UNSPECIFIED BACK PAIN LATERALITY, UNSPECIFIED CHRONICITY, WITH SCIATICA PRESENCE UNSPECIFIED: Primary | ICD-10-CM

## 2018-10-04 PROCEDURE — 97140 MANUAL THERAPY 1/> REGIONS: CPT

## 2018-10-04 PROCEDURE — 97140 MANUAL THERAPY 1/> REGIONS: CPT | Performed by: PHYSICAL THERAPIST

## 2018-10-04 PROCEDURE — 97110 THERAPEUTIC EXERCISES: CPT

## 2018-10-04 NOTE — THERAPY TREATMENT NOTE
Outpatient Physical Therapy Ortho Treatment Note  [facility]     Patient Name: Vanessa Dunaway  : 1970  MRN: [id number]  Today's Date: 10/4/2018      Visit Date: 10/04/2018  Subjective Improvement:  40-45%     Attendance: 10/10  Approved:   30 visits        MD follow up:    CLAUDIA VALENZUELA date:     10/15/18  Visit Dx:    ICD-10-CM ICD-9-CM   1. Low back pain, unspecified back pain laterality, unspecified chronicity, with sciatica presence unspecified M54.5 724.2       There is no problem list on file for this patient.       Past Medical History:   Diagnosis Date   • Frequent menstruation    • Menstruation, excessive    • Mitral valve prolapse    • Palpitations    • PONV (postoperative nausea and vomiting)     DIFFICULTY WAKING UP        Past Surgical History:   Procedure Laterality Date   • BREAST BIOPSY Bilateral    • CERVICAL LAMINECTOMY     • D&C HYSTEROSCOPY ENDOMETRIAL ABLATION N/A 2017    Procedure: DILATATION AND CURETTAGE HYSTEROSCOPY NOVASURE ENDOMETRIAL ABLATION;  Surgeon: Lesvia Bermudez DO;  Location: [facility] OR;  Service:    • LASIK               PT Ortho     Row Name 10/04/18 1600       Subjective Comments    Subjective Comments Pt states she lifted a big box and incr pn. She notes that she had improved with needling, less sharp pn in back and less radicular pn.  -PM       Precautions and Contraindications    Precautions/Limitations no known precautions/limitations  -PM    Contraindications LATEX ALLERGY  -PM      User Key  (r) = Recorded By, (t) = Taken By, (c) = Cosigned By    Initials Name Provider Type    PM Rajni Bone PTA Physical Therapy Assistant                            PT Assessment/Plan     Row Name 10/04/18 1600          PT Assessment    Functional Limitations Limitation in home management;Limitations in community activities;Limitations in functional capacity and performance;Performance in leisure activities;Performance in self-care  "ADL;Performance in work activities  -PM     Impairments Impaired flexibility;Impaired muscle endurance;Joint mobility;Muscle strength;Pain;Posture;Range of motion  -PM     Assessment Comments Pt noted some improvement following dry needling by PT until she picked up something today that was heavier than she thought and subsequently incr pn again with radicular R LE. Pt IS/SI with asymmetry, and pt was guarded with manual attempts to correct. Pt needs some cues for postural alignment on DynaDisc.  -PM     Rehab Potential Good  -PM     Patient/caregiver participated in establishment of treatment plan and goals Yes  -PM     Patient would benefit from skilled therapy intervention Yes  -PM        PT Plan    PT Frequency 2x/week  -PM     Predicted Duration of Therapy Intervention (Therapy Eval) 3-4 weeks  -PM     PT Plan Comments try SB PN TA ther ex; possible sit to stand with iso TA; monitor alignment; cont dry needling with PT and sarina when [place]  -PM       User Key  (r) = Recorded By, (t) = Taken By, (c) = Cosigned By    Initials Name Provider Type    PM Rajni Bone PTA Physical Therapy Assistant                    Exercises     Row Name 10/04/18 1600             Subjective Comments    Subjective Comments Pt states she lifted a big box and incr pn. She notes that she had improved with needling, less sharp pn in back and less radicular pn.  -PM         Subjective Pain    Able to rate subjective pain? yes  -PM      Pre-Treatment Pain Level 6  -PM      Post-Treatment Pain Level 6  -PM         Exercise 1    Exercise Name 1 std HS S  -PM      Cueing 1 Verbal  -PM      Reps 1 2  -PM      Time 1 30\"  -PM         Exercise 2    Exercise Name 2 seated and supine piriformis S  -PM      Reps 2 2  -PM      Time 2 30\"  -PM         Exercise 3    Exercise Name 3 Anum Disc PN march  -PM      Cueing 3 Verbal  -PM      Sets 3 2  -PM      Reps 3 10  -PM         Exercise 4    Exercise Name 4 Anum Disc PN " "LAQ  -PM      Cueing 4 Verbal  -PM      Sets 4 2  -PM      Reps 4 10  -PM         Exercise 5    Exercise Name 5 std R hip abd w/TA  -PM      Cueing 5 Verbal  -PM      Sets 5 2  -PM      Reps 5 10  -PM      Additional Comments unable to std on R  -PM         Exercise 6    Exercise Name 6 bridge w/TA  -PM      Sets 6 2  -PM      Reps 6 8  -PM         Exercise 7    Exercise Name 7 SL Clam w/TA  -PM      Cueing 7 Verbal  -PM      Sets 7 1  -PM      Reps 7 10  -PM         Exercise 8    Exercise Name 8 Yovani Test hip flexor S  -PM      Reps 8 2  -PM      Time 8 30\"  -PM        User Key  (r) = Recorded By, (t) = Taken By, (c) = Cosigned By    Initials Name Provider Type    PM Rajni Bone PTA Physical Therapy Assistant                        Manual Rx (last 36 hours)      Manual Treatments     Row Name 10/04/18 1600             Manual Rx 1    Manual Rx 1 Location TPDN R Piriformis; MFR  -KG      Manual Rx 1 Duration 12 min  -KG         Manual Rx 2    Manual Rx 2 Location IS/SI  -PM      Manual Rx 2 Type MET; sacral mob; manual attempts to correct   unable to fully correct as pt noted incr pn  -PM      Manual Rx 2 Duration 8min  -PM         Manual Rx 3    Manual Rx 3 Location Lumbar paraspinals  -PM      Manual Rx 3 Type MFR/STM  -PM      Manual Rx 3 Grade 4 min  -PM        User Key  (r) = Recorded By, (t) = Taken By, (c) = Cosigned By    Initials Name Provider Type    PM Rajni Bone PTA Physical Therapy Assistant    KG Kaleigh Jim, PT Physical Therapist                PT OP Goals     Row Name 10/04/18 1600          PT Short Term Goals    STG Date to Achieve 10/08/18  -PM     STG 1 Independent/compliant with HEP  -PM     STG 1 Progress Met;Ongoing  -PM     STG 2 Tolerate 45 minute treatment session without increased pain  -PM     STG 2 Progress Partially Met;Ongoing  -PM     STG 3 Demonstrate independence in isometric TrA activities throughout treatment session  -PM     STG 3 Progress Partially Met  " -PM     STG 4 Demonstrate bilateral hip abd MMT to 4-/5  -PM     STG 4 Progress Met  -PM        Long Term Goals    LTG Date to Achieve 10/22/18  -PM     LTG 1 Subjectively report 60% improvement or greater  -PM     LTG 1 Progress Progressing  -PM     LTG 2 Improve modified oswestry score to 25% or less  -PM     LTG 2 Progress Progressing  -PM     LTG 3 Demonstrate bilateral lumbar SB AROM WNL without increased pain  -PM     LTG 3 Progress Progressing  -PM     LTG 4 Demonstrate bilateral hip flex MMT to 4+/5  -PM     LTG 4 Progress Progressing  -PM     LTG 5 Demonstrate bilateral hip abd MM to 4+/5  -PM     LTG 5 Progress Progressing  -PM     LTG 6 Perform 10-15 minutes of standing therex without increased pain/RLE symptoms  -PM     LTG 6 Progress Progressing  -PM        Time Calculation    PT Goal Re-Cert Due Date 10/15/18  -PM       User Key  (r) = Recorded By, (t) = Taken By, (c) = Cosigned By    Initials Name Provider Type    PM Rajni Bone PTA Physical Therapy Assistant          Therapy Education  Given: HEP, Symptoms/condition management, Posture/body mechanics  Program: Reinforced  How Provided: Verbal  Provided to: Patient  Level of Understanding: Verbalized, Demonstrated              Time Calculation:   Start Time: 1647  Stop Time: 1747  Time Calculation (min): 0 min  Total Timed Code Minutes- PT: 60 minute(s)  Therapy Suggested Charges     Code   Minutes Charges    None           Therapy Charges for Today     Code Description Service Date Service Provider Modifiers Qty    06228682382 HC PT MANUAL THERAPY EA 15 MIN 10/4/2018 Rajni Bone PTA GP 1    21613428827 HC PT THER PROC EA 15 MIN 10/4/2018 Rajni Bone PTA GP 2        Kaleigh Jim, PT, DPT performed soft tissue dry needling to patient [phone], 1 unit of manual. 10/4/2018-plm            Rajni Bone PTA  10/4/2018

## 2018-10-12 ENCOUNTER — HOSPITAL ENCOUNTER (OUTPATIENT)
Dept: PHYSICAL THERAPY | Facility: HOSPITAL | Age: 48
Setting detail: THERAPIES SERIES
Discharge: HOME | End: 2018-10-12

## 2018-10-12 DIAGNOSIS — M54.5 LOW BACK PAIN, UNSPECIFIED BACK PAIN LATERALITY, UNSPECIFIED CHRONICITY, WITH SCIATICA PRESENCE UNSPECIFIED: Primary | ICD-10-CM

## 2018-10-12 PROCEDURE — 97032 APPL MODALITY 1+ESTIM EA 15: CPT

## 2018-10-12 PROCEDURE — 97110 THERAPEUTIC EXERCISES: CPT

## 2018-10-12 NOTE — THERAPY TREATMENT NOTE
Outpatient Physical Therapy Ortho Treatment Note  [facility]     Patient Name: Vanessa Dunaway  : 1970  MRN: [id number]  Today's Date: 10/12/2018      Visit Date: 10/12/2018  Visits . Recert 10/15. MD davison/rikki TAYLOR. 45% improvement.   Visit Dx:    ICD-10-CM ICD-9-CM   1. Low back pain, unspecified back pain laterality, unspecified chronicity, with sciatica presence unspecified M54.5 724.2       There is no problem list on file for this patient.       Past Medical History:   Diagnosis Date   • Frequent menstruation    • Menstruation, excessive    • Mitral valve prolapse    • Palpitations    • PONV (postoperative nausea and vomiting)     DIFFICULTY WAKING UP        Past Surgical History:   Procedure Laterality Date   • BREAST BIOPSY Bilateral    • CERVICAL LAMINECTOMY     • D&C HYSTEROSCOPY ENDOMETRIAL ABLATION N/A 2017    Procedure: DILATATION AND CURETTAGE HYSTEROSCOPY NOVASURE ENDOMETRIAL ABLATION;  Surgeon: Lesvia Bermudez DO;  Location: [facility] OR;  Service:    • LASIK               PT Ortho     Row Name 10/12/18 1100       Subjective Pain    Post-Treatment Pain Level 3  -      User Key  (r) = Recorded By, (t) = Taken By, (c) = Cosigned By    Initials Name Provider Type    Lokesh Warren PTA Physical Therapy Assistant                            PT Assessment/Plan     Row Name 10/12/18 1100          PT Assessment    Functional Limitations Limitation in home management;Limitations in community activities;Limitations in functional capacity and performance;Performance in leisure activities;Performance in self-care ADL;Performance in work activities  -     Impairments Impaired flexibility;Impaired muscle endurance;Joint mobility;Muscle strength;Pain;Posture;Range of motion  -     Assessment Comments Fair thony of today's ther ex with some increased reactivity post. Pain decreased with modality. Pt compliant with HEP.    -     Rehab Potential Good  -JW      "Patient/caregiver participated in establishment of treatment plan and goals Yes  -JW     Patient would benefit from skilled therapy intervention Yes  -JW        PT Plan    PT Frequency 2x/week  -JW     Predicted Duration of Therapy Intervention (Therapy Eval) 3-4 weeks  -JW     PT Plan Comments Cont PT per POC.  -JW       User Key  (r) = Recorded By, (t) = Taken By, (c) = Cosigned By    Initials Name Provider Type    Lokesh Warren PTA Physical Therapy Assistant                Modalities     Row Name 10/12/18 1100             ELECTRICAL STIMULATION    Attended/Unattended Unattended  -JW      Stimulation Type IFC  -JW      Max mAmp 11  -JW      Location/Electrode Placement/Other R LB  -JW      91425 - PT Electrical Stimulation Attended (Manual) Minutes 15  -JW        User Key  (r) = Recorded By, (t) = Taken By, (c) = Cosigned By    Initials Name Provider Type    Lokesh Warren PTA Physical Therapy Assistant                Exercises     Row Name 10/12/18 1100             Subjective Comments    Subjective Comments Pt reports Having a long car ride yesterday. She continues to have LBP that is gradually improving. She is doing HEP.   -JW         Subjective Pain    Able to rate subjective pain? yes  -JW      Pre-Treatment Pain Level 3  -JW      Post-Treatment Pain Level 3  -JW         Exercise 1    Exercise Name 1 Prone R hip IR S  -JW      Reps 1 10  -JW         Exercise 2    Exercise Name 2 Prone B LE distraction  -JW      Time 2 4', intermitt  -JW         Exercise 3    Exercise Name 3 R Piriformis S  -JW      Sets 3 2  -JW      Time 3 30\"  -JW         Exercise 4    Exercise Name 4 R HS S  -JW      Sets 4 2  -JW      Time 4 30\"  -JW         Exercise 5    Exercise Name 5 B Yovani S  -JW      Sets 5 2  -JW      Time 5 30\"  -JW      Additional Comments --   instructed kneeling version for HEP  -JW         Exercise 6    Exercise Name 6 Bridges  -JW      Reps 6 10  -JW      Time 6 10\"  -JW         Exercise 7    " Exercise Name 7 H/L core iso. alt UE/LE   -JW      Reps 7 10x ea  -JW         Exercise 8    Exercise Name 8 Clamshells  -JW      Sets 8 2  -JW      Reps 8 to thony  -        User Key  (r) = Recorded By, (t) = Taken By, (c) = Cosigned By    Initials Name Provider Type    Lokesh Warren PTA Physical Therapy Assistant                               PT OP Goals     Row Name 10/12/18 1205 10/12/18 1100       PT Short Term Goals    STG Date to Achieve  -- 10/08/18  -    STG 1  -- Independent/compliant with HEP  -    STG 1 Progress  -- Met;Ongoing  -    STG 2  -- Tolerate 45 minute treatment session without increased pain  -    STG 2 Progress  -- Partially Met;Ongoing  -    STG 3  -- Demonstrate independence in isometric TrA activities throughout treatment session  -    STG 3 Progress  -- Partially Met  -    STG 4  -- Demonstrate bilateral hip abd MMT to 4-/5  -    STG 4 Progress  -- Met  -       Long Term Goals    LTG Date to Achieve  -- 10/22/18  -    LTG 1  -- Subjectively report 60% improvement or greater  -    LTG 1 Progress  -- Progressing  -    LTG 2  -- Improve modified oswestry score to 25% or less  -    LT 2 Progress  -- Progressing  -    LTG 3  -- Demonstrate bilateral lumbar SB AROM WNL without increased pain  -    LTG 3 Progress  -- Progressing  -    LTG 4  -- Demonstrate bilateral hip flex MMT to 4+/5  -    LTG 4 Progress  -- Progressing  -    LTG 5  -- Demonstrate bilateral hip abd MM to 4+/5  -    LTG 5 Progress  -- Progressing  -    LTG 6  -- Perform 10-15 minutes of standing therex without increased pain/RLE symptoms  -    LTG 6 Progress  -- Progressing  -       Time Calculation    PT Goal Re-Cert Due Date 10/15/18  -  --      User Key  (r) = Recorded By, (t) = Taken By, (c) = Cosigned By    Initials Name Provider Type    Lokesh Warren PTA Physical Therapy Assistant                         Time Calculation:   Start Time: 1115  Stop Time:  1203  Time Calculation (min): 48 min  Total Timed Code Minutes- PT: 48 minute(s)  Therapy Suggested Charges     Code   Minutes Charges    15455 (CPT®) Hc Pt Neuromusc Re Education Ea 15 Min      70133 (CPT®) Hc Pt Ther Proc Ea 15 Min      88734 (CPT®) Hc Gait Training Ea 15 Min      13432 (CPT®) Hc Pt Therapeutic Act Ea 15 Min      31909 (CPT®) Hc Pt Manual Therapy Ea 15 Min      94091 (CPT®) Hc Pt Ther Massage- Per 15 Min      86780 (CPT®) Hc Pt Iontophoresis Ea 15 Min      70287 (CPT®) Hc Pt Elec Stim Ea-Per 15 Min 15 1    00967 (CPT®) Hc Pt Ultrasound Ea 15 Min      26076 (CPT®) Hc Pt Self Care/Mgmt/Train Ea 15 Min      21354 (CPT®) Hc Pt Prosthetic (S) Train Initial Encounter, Each 15 Min      98950 (CPT®) Hc Orthotic(S) Mgmt/Train Initial Encounter, Each 15min      44256 (CPT®) Hc Pt Aquatic Therapy Ea 15 Min      89121 (CPT®) Hc Pt Orthotic(S)/Prosthetic(S) Encounter, Each 15 Min       (CPT®) Hc Pt Electrical Stim Unattended      Total  15 1        Therapy Charges for Today     Code Description Service Date Service Provider Modifiers Qty    01899919360 HC PT ELEC STIM EA-PER 15 MIN 10/12/2018 Lokesh Moran, PTA GP 1    39034412836 HC PT THER PROC EA 15 MIN 10/12/2018 Lokesh Moran, PTA GP 2                    Lokesh Moran, PTA  10/12/2018

## 2018-10-18 ENCOUNTER — HOSPITAL ENCOUNTER (OUTPATIENT)
Dept: PHYSICAL THERAPY | Facility: HOSPITAL | Age: 48
Setting detail: THERAPIES SERIES
End: 2018-10-18

## 2018-10-26 ENCOUNTER — DOCUMENTATION (OUTPATIENT)
Dept: PHYSICAL THERAPY | Facility: HOSPITAL | Age: 48
End: 2018-10-26

## 2018-10-26 DIAGNOSIS — M54.5 LOW BACK PAIN, UNSPECIFIED BACK PAIN LATERALITY, UNSPECIFIED CHRONICITY, WITH SCIATICA PRESENCE UNSPECIFIED: Primary | ICD-10-CM

## 2018-10-26 NOTE — THERAPY DISCHARGE NOTE
Outpatient Physical Therapy Discharge Summary         Patient Name: Vanessa Dunaway  : 1970  MRN: [id number]    Today's Date: 10/26/2018    Visit Dx:    ICD-10-CM ICD-9-CM   1. Low back pain, unspecified back pain laterality, unspecified chronicity, with sciatica presence unspecified M54.5 724.2             PT OP Goals     Row Name 10/26/18 1200          PT Short Term Goals    STG Date to Achieve 10/08/18  -KG     STG 1 Independent/compliant with HEP  -KG     STG 1 Progress Met  -KG     STG 2 Tolerate 45 minute treatment session without increased pain  -KG     STG 2 Progress Met  -KG     STG 3 Demonstrate independence in isometric TrA activities throughout treatment session  -KG     STG 3 Progress Met  -KG     STG 4 Demonstrate bilateral hip abd MMT to 4-/5  -KG     STG 4 Progress Met  -KG        Long Term Goals    LTG Date to Achieve 10/22/18  -KG     LTG 1 Subjectively report 60% improvement or greater  -KG     LTG 1 Progress Not Met  -KG     LTG 1 Progress Comments 45-50% improvement  -KG     LTG 2 Improve modified oswestry score to 25% or less  -KG     LTG 2 Progress Partially Met  -KG     LTG 3 Demonstrate bilateral lumbar SB AROM WNL without increased pain  -KG     LTG 3 Progress Not Met  -KG     LTG 4 Demonstrate bilateral hip flex MMT to 4+/5  -KG     LTG 4 Progress Not Met  -KG     LTG 5 Demonstrate bilateral hip abd MM to 4+/5  -KG     LTG 5 Progress Not Met  -KG     LTG 6 Perform 10-15 minutes of standing therex without increased pain/RLE symptoms  -KG     LTG 6 Progress Partially Met  -KG       User Key  (r) = Recorded By, (t) = Taken By, (c) = Cosigned By    Initials Name Provider Type    KG Kaleigh Jim, PT Physical Therapist          OP PT Discharge Summary  Date of Discharge: 10/26/18  Reason for Discharge: Patient/Caregiver request, other (comment) (Pt came into clinic and requested to be discharged. States she's unable to attend PT consistently due to being busy with work, etc.  )  Outcomes Achieved: Patient able to partially acheive established goals, Refer to plan of care for updates on goals achieved  Discharge Destination: Home with home program  Discharge Instructions/Additional Comments: Pt overall has reported 45% improvement. Pt very compliant with HEP and has continued to complete consistently. Pt made fairly good progress overall. States she would like to continue PT but does have the time at this time. Pt discharged this date and will follow-up prn with any questions or concerns. States she's able to manage her pain at this time & will continue HEP independently.      Time Calculation:        Therapy Suggested Charges     Code   Minutes Charges    None                       Kaleigh Jim, PT  10/26/2018

## 2018-12-12 ENCOUNTER — TRANSCRIBE ORDERS (OUTPATIENT)
Dept: ADMINISTRATIVE | Facility: HOSPITAL | Age: 48
End: 2018-12-12

## 2018-12-12 DIAGNOSIS — Z12.31 ENCOUNTER FOR SCREENING MAMMOGRAM FOR MALIGNANT NEOPLASM OF BREAST: Primary | ICD-10-CM

## 2019-01-25 ENCOUNTER — HOSPITAL ENCOUNTER (OUTPATIENT)
Dept: MAMMOGRAPHY | Facility: HOSPITAL | Age: 49
Discharge: HOME | End: 2019-01-25
Attending: OBSTETRICS & GYNECOLOGY | Admitting: OBSTETRICS & GYNECOLOGY

## 2019-01-25 DIAGNOSIS — Z12.31 ENCOUNTER FOR SCREENING MAMMOGRAM FOR MALIGNANT NEOPLASM OF BREAST: ICD-10-CM

## 2019-01-25 PROCEDURE — 77067 SCR MAMMO BI INCL CAD: CPT

## 2019-01-25 PROCEDURE — 77063 BREAST TOMOSYNTHESIS BI: CPT

## 2019-02-01 ENCOUNTER — HOSPITAL ENCOUNTER (OUTPATIENT)
Dept: ULTRASOUND IMAGING | Facility: HOSPITAL | Age: 49
Discharge: HOME | End: 2019-02-01
Attending: OBSTETRICS & GYNECOLOGY | Admitting: OBSTETRICS & GYNECOLOGY

## 2019-02-01 ENCOUNTER — TRANSCRIBE ORDERS (OUTPATIENT)
Dept: ADMINISTRATIVE | Facility: HOSPITAL | Age: 49
End: 2019-02-01

## 2019-02-01 DIAGNOSIS — R92.8 ABNORMAL MAMMOGRAM: Primary | ICD-10-CM

## 2019-02-01 DIAGNOSIS — R92.8 ABNORMAL MAMMOGRAM: ICD-10-CM

## 2019-02-01 PROCEDURE — 76642 ULTRASOUND BREAST LIMITED: CPT

## 2019-08-14 ENCOUNTER — TRANSCRIBE ORDERS (OUTPATIENT)
Dept: ADMINISTRATIVE | Facility: HOSPITAL | Age: 49
End: 2019-08-14

## 2019-08-14 DIAGNOSIS — N63.21 UNSPECIFIED LUMP IN THE LEFT BREAST, UPPER OUTER QUADRANT: Primary | ICD-10-CM

## 2019-08-23 ENCOUNTER — HOSPITAL ENCOUNTER (OUTPATIENT)
Dept: ULTRASOUND IMAGING | Facility: HOSPITAL | Age: 49
Discharge: HOME | End: 2019-08-23
Admitting: OBSTETRICS & GYNECOLOGY

## 2019-08-23 DIAGNOSIS — N63.21 UNSPECIFIED LUMP IN THE LEFT BREAST, UPPER OUTER QUADRANT: ICD-10-CM

## 2019-08-23 PROCEDURE — 76642 ULTRASOUND BREAST LIMITED: CPT

## 2019-08-30 ENCOUNTER — APPOINTMENT (OUTPATIENT)
Dept: ULTRASOUND IMAGING | Facility: HOSPITAL | Age: 49
End: 2019-08-30

## 2020-02-28 ENCOUNTER — OFFICE VISIT (OUTPATIENT)
Dept: OBSTETRICS AND GYNECOLOGY | Facility: CLINIC | Age: 50
End: 2020-02-28

## 2020-02-28 VITALS
BODY MASS INDEX: 25.1 KG/M2 | HEIGHT: 64 IN | WEIGHT: 147 LBS | SYSTOLIC BLOOD PRESSURE: 120 MMHG | DIASTOLIC BLOOD PRESSURE: 80 MMHG

## 2020-02-28 DIAGNOSIS — Z12.31 SCREENING MAMMOGRAM, ENCOUNTER FOR: ICD-10-CM

## 2020-02-28 DIAGNOSIS — Z01.419 ENCOUNTER FOR GYNECOLOGICAL EXAMINATION WITHOUT ABNORMAL FINDING: Primary | ICD-10-CM

## 2020-02-28 PROCEDURE — 99386 PREV VISIT NEW AGE 40-64: CPT | Performed by: OBSTETRICS & GYNECOLOGY

## 2020-02-28 RX ORDER — NABUMETONE 500 MG/1
500 TABLET, FILM COATED ORAL 2 TIMES DAILY PRN
COMMUNITY
Start: 2019-12-23

## 2020-02-28 RX ORDER — ALENDRONATE SODIUM 70 MG/1
70 TABLET ORAL WEEKLY
COMMUNITY
Start: 2020-02-08

## 2020-02-28 RX ORDER — ALPRAZOLAM 0.25 MG/1
0.25 TABLET ORAL 3 TIMES DAILY PRN
COMMUNITY
Start: 2020-02-08

## 2020-02-28 RX ORDER — METHOCARBAMOL 500 MG/1
TABLET, FILM COATED ORAL
COMMUNITY
Start: 2020-02-18

## 2020-02-29 LAB
25(OH)D3+25(OH)D2 SERPL-MCNC: 44 NG/ML (ref 30–100)
TSH SERPL DL<=0.005 MIU/L-ACNC: 3.53 UIU/ML (ref 0.27–4.2)

## 2020-03-06 ENCOUNTER — HOSPITAL ENCOUNTER (OUTPATIENT)
Dept: MAMMOGRAPHY | Facility: HOSPITAL | Age: 50
Discharge: HOME | End: 2020-03-06

## 2020-03-06 ENCOUNTER — HOSPITAL ENCOUNTER (OUTPATIENT)
Dept: MAMMOGRAPHY | Facility: HOSPITAL | Age: 50
Discharge: HOME | End: 2020-03-06
Admitting: OBSTETRICS & GYNECOLOGY

## 2020-03-06 ENCOUNTER — HOSPITAL ENCOUNTER (OUTPATIENT)
Dept: ULTRASOUND IMAGING | Facility: HOSPITAL | Age: 50
Discharge: HOME | End: 2020-03-06

## 2020-03-06 DIAGNOSIS — Z12.31 SCREENING MAMMOGRAM, ENCOUNTER FOR: Primary | ICD-10-CM

## 2020-03-06 DIAGNOSIS — Z12.31 SCREENING MAMMOGRAM, ENCOUNTER FOR: ICD-10-CM

## 2020-03-06 PROCEDURE — 76642 ULTRASOUND BREAST LIMITED: CPT

## 2020-03-06 PROCEDURE — G0279 TOMOSYNTHESIS, MAMMO: HCPCS

## 2020-03-06 PROCEDURE — 77066 DX MAMMO INCL CAD BI: CPT

## 2020-03-08 DIAGNOSIS — R92.8 ABNORMAL MAMMOGRAM: Primary | ICD-10-CM

## 2020-03-11 DIAGNOSIS — R92.8 ABNORMAL MAMMOGRAM: Primary | ICD-10-CM

## 2020-06-05 ENCOUNTER — OFFICE VISIT (OUTPATIENT)
Dept: OBSTETRICS AND GYNECOLOGY | Facility: CLINIC | Age: 50
End: 2020-06-05

## 2020-06-05 VITALS
WEIGHT: 147 LBS | BODY MASS INDEX: 25.1 KG/M2 | SYSTOLIC BLOOD PRESSURE: 120 MMHG | HEIGHT: 64 IN | DIASTOLIC BLOOD PRESSURE: 76 MMHG

## 2020-06-05 DIAGNOSIS — D25.9 UTERINE LEIOMYOMA, UNSPECIFIED LOCATION: Primary | ICD-10-CM

## 2020-06-05 PROCEDURE — 99213 OFFICE O/P EST LOW 20 MIN: CPT | Performed by: OBSTETRICS & GYNECOLOGY

## 2020-06-05 RX ORDER — MULTIVITAMIN WITH IRON
TABLET ORAL
COMMUNITY
Start: 2019-11-01

## 2020-06-05 RX ORDER — ECHINACEA 400 MG
CAPSULE ORAL
COMMUNITY
Start: 2019-11-01

## 2020-06-05 NOTE — PROGRESS NOTES
"Subjective   Vanessa Dunaway is a 49 y.o. female.     Chief Complaint   Patient presents with   • Follow-up     PT is here for follow up for an posterior fibroid  Pt has had and an US done.  Pt is doing well no c/o        49-year-old female  1 para 1 presents for follow-up on fibroids.  Patient reports that she is had no complaints.  She does report she is still having occasional lower pain.  She reports that she has had no vaginal bleeding since she previously had an ablation.       Answers for HPI/ROS submitted by the patient on 2020   What is the primary reason for your visit?: Other  Please describe your symptoms.: Follow up uterine u/s to assess fibroids  Have you had these symptoms before?: Yes  How long have you been having these symptoms?: Greater than 2 weeks  Please list any medications you are currently taking for this condition.: N/a      Review of Systems   Constitutional: Negative for chills and fever.   Genitourinary: Positive for pelvic pain.       Objective   /76   Ht 162.6 cm (64\")   Wt 66.7 kg (147 lb)   BMI 25.23 kg/m²   No LMP recorded. Patient has had an ablation.  Physical Exam   Constitutional: She is oriented to person, place, and time. She appears well-developed and well-nourished. No distress.   HENT:   Head: Normocephalic and atraumatic.   Eyes: Conjunctivae are normal. Right eye exhibits no discharge. Left eye exhibits no discharge.   Neck: Normal range of motion.   Pulmonary/Chest: Effort normal.   Musculoskeletal: Normal range of motion.   Neurological: She is alert and oriented to person, place, and time.   Skin: Skin is warm and dry.   Psychiatric: She has a normal mood and affect. Her behavior is normal. Judgment normal.   Nursing note and vitals reviewed.    Assessment/Plan   Problems Addressed this Visit     None      Visit Diagnoses     Uterine leiomyoma, unspecified location    -  Primary      -Transvaginal ultrasound revealed no change in fibroid at this " time.  There was a plexus appearing ovarian cyst which was not previously present.  Patient is asymptomatic at this time.  -Return to clinic in 9 months for annual examination or sooner symptoms worsen.  Will have patient have transvaginal ultrasound when she returns for her annual examination  -Questions answered and patient verbalized understanding.       Tomasa Mclean, DO

## 2021-03-02 ENCOUNTER — OFFICE VISIT (OUTPATIENT)
Dept: OBSTETRICS AND GYNECOLOGY | Facility: CLINIC | Age: 51
End: 2021-03-02

## 2021-03-02 VITALS
BODY MASS INDEX: 25.78 KG/M2 | WEIGHT: 151 LBS | HEIGHT: 64 IN | SYSTOLIC BLOOD PRESSURE: 120 MMHG | DIASTOLIC BLOOD PRESSURE: 72 MMHG

## 2021-03-02 DIAGNOSIS — N81.6 CYSTOCELE WITH RECTOCELE: ICD-10-CM

## 2021-03-02 DIAGNOSIS — Z12.4 SCREENING FOR CERVICAL CANCER: ICD-10-CM

## 2021-03-02 DIAGNOSIS — Z78.0 MENOPAUSE: ICD-10-CM

## 2021-03-02 DIAGNOSIS — N81.10 CYSTOCELE WITH RECTOCELE: ICD-10-CM

## 2021-03-02 DIAGNOSIS — Z01.419 ENCOUNTER FOR GYNECOLOGICAL EXAMINATION WITHOUT ABNORMAL FINDING: Primary | ICD-10-CM

## 2021-03-02 PROCEDURE — 99396 PREV VISIT EST AGE 40-64: CPT | Performed by: OBSTETRICS & GYNECOLOGY

## 2021-03-02 PROCEDURE — G0123 SCREEN CERV/VAG THIN LAYER: HCPCS | Performed by: OBSTETRICS & GYNECOLOGY

## 2021-03-02 PROCEDURE — 87624 HPV HI-RISK TYP POOLED RSLT: CPT | Performed by: OBSTETRICS & GYNECOLOGY

## 2021-03-02 RX ORDER — ZINC GLUCONATE 50 MG
TABLET ORAL
COMMUNITY

## 2021-03-02 RX ORDER — PRASTERONE (DHEA) 50 MG
1 TABLET ORAL
COMMUNITY

## 2021-03-02 NOTE — PROGRESS NOTES
Subjective   Vanessa Dunaway is a 50 y.o. female  YOB: 1970        Chief Complaint   Patient presents with   • Gynecologic Exam     Pt is here for annual exam  Pt has noticed a change in her pelvic floor.         50-year-old female  1 para 1 status post endometrial ablation presents for annual examination.  Patient reports overall she is doing well.  She does report that she has noticed some pelvic organ prolapse the past year.  She denies any issues with incontinence or having to splint to go the bathroom.  She denies any changes to her medical or surgical history.  She does report a history of abnormal Paps in her 20s.  She is a previous patient of Dr. Bermudez's.  Reports that her primary care provider placed her on Fosamax but that she has not had a recent DEXA scan for follow-up.      Allergies   Allergen Reactions   • Augmentin [Amoxicillin-Pot Clavulanate] Rash   • Ceclor [Cefaclor] Rash   • Erythromycin Rash   • Latex Rash     Notified Kely, in surgery scheduling, of latex allergy   • Penicillins Rash       Past Medical History:   Diagnosis Date   • Fibroids    • Frequent menstruation    • Menstruation, excessive    • Mitral valve prolapse    • Palpitations    • PONV (postoperative nausea and vomiting)     DIFFICULTY WAKING UP       Family History   Problem Relation Age of Onset   • Breast cancer Maternal Aunt    • Diabetes Father    • Heart disease Father    • Hypertension Father    • Liver cancer Father    • Thyroid cancer Father    • Diabetes Mother    • Heart disease Mother    • Hypertension Mother    • No Known Problems Brother    • No Known Problems Sister    • No Known Problems Son        Social History     Socioeconomic History   • Marital status:      Spouse name: Not on file   • Number of children: Not on file   • Years of education: Not on file   • Highest education level: Not on file   Tobacco Use   • Smoking status: Never Smoker   • Smokeless tobacco: Never Used    Substance and Sexual Activity   • Alcohol use: Yes     Comment: occ   • Drug use: No   • Sexual activity: Yes     Partners: Male     Birth control/protection: Surgical         Current Outpatient Medications:   •  alendronate (FOSAMAX) 70 MG tablet, Take 70 mg by mouth 1 (One) Time Per Week., Disp: , Rfl:   •  ALPRAZolam (XANAX) 0.25 MG tablet, Take 0.25 mg by mouth 3 (Three) Times a Day As Needed., Disp: , Rfl:   •  B Complex-C (SUPER B COMPLEX PO), , Disp: , Rfl:   •  CALCIUM PO, 1200 mg tablet one tablet by mouth daily, Disp: , Rfl:   •  Cholecalciferol (VITAMIN D3) 125 MCG (5000 UT) capsule capsule, , Disp: , Rfl:   •  DHEA 50 MG tablet, Take 1 tablet by mouth., Disp: , Rfl:   •  Flaxseed, Linseed, (FLAXSEED OIL) 1000 MG capsule, , Disp: , Rfl:   •  Garlic (GARLIQUE PO), , Disp: , Rfl:   •  Magnesium 250 MG tablet, , Disp: , Rfl:   •  methocarbamol (ROBAXIN) 500 MG tablet, TAKE 1 TABLET BY MOUTH FOUR TIMES A DAY AS NEEDED FOR PAIN, Disp: , Rfl:   •  Misc Natural Products (COLON CLEANSE PO), , Disp: , Rfl:   •  Multiple Vitamins-Minerals (MULTIVITAMIN ADULT PO), Take  by mouth., Disp: , Rfl:   •  nabumetone (RELAFEN) 500 MG tablet, Take 500 mg by mouth 2 (Two) Times a Day As Needed. for pain, Disp: , Rfl:   •  Plant Sterols and Stanols 450 MG capsule, Take  by mouth., Disp: , Rfl:   •  Probiotic Product (PROBIOTIC DAILY PO), Take  by mouth., Disp: , Rfl:   •  Zinc 50 MG tablet, , Disp: , Rfl:   •  Prasterone, DHEA, (DHEA 50) 50 MG capsule, , Disp: , Rfl:     No LMP recorded. Patient has had an ablation.    Sexual History:         Could not be calculated    Past Surgical History:   Procedure Laterality Date   • BREAST BIOPSY Bilateral    • CERVICAL LAMINECTOMY     • COLONOSCOPY     • D&C HYSTEROSCOPY ENDOMETRIAL ABLATION N/A 5/5/2017    Procedure: DILATATION AND CURETTAGE HYSTEROSCOPY NOVASURE ENDOMETRIAL ABLATION;  Surgeon: Lesvia Bermudez DO;  Location: [facility] OR;  Service:    • LASIK         Review of  Systems   Constitutional: Negative for activity change and unexpected weight loss.   HENT: Negative for congestion.    Cardiovascular: Negative for chest pain.   Gastrointestinal: Negative for blood in stool, constipation and diarrhea.   Endocrine: Negative for cold intolerance and heat intolerance.   Genitourinary: Negative for dyspareunia, pelvic pain and vaginal discharge.   Musculoskeletal: Positive for back pain. Negative for arthralgias, neck pain and neck stiffness.   Skin: Negative for rash.   Neurological: Negative for dizziness and headache.   Psychiatric/Behavioral: Negative for sleep disturbance. The patient is not nervous/anxious.        Objective   Physical Exam  Vitals signs and nursing note reviewed. Exam conducted with a chaperone present.   Constitutional:       General: She is not in acute distress.     Appearance: She is well-developed.   HENT:      Head: Normocephalic and atraumatic.   Eyes:      General:         Right eye: No discharge.         Left eye: No discharge.      Conjunctiva/sclera: Conjunctivae normal.   Neck:      Musculoskeletal: Normal range of motion and neck supple.   Cardiovascular:      Rate and Rhythm: Normal rate and regular rhythm.      Heart sounds: No murmur.   Pulmonary:      Effort: Pulmonary effort is normal.      Breath sounds: Normal breath sounds.   Chest:      Breasts:         Right: No inverted nipple or mass.         Left: No inverted nipple or mass.   Abdominal:      General: There is no distension.      Palpations: Abdomen is soft.      Tenderness: There is no abdominal tenderness.   Genitourinary:     Exam position: Supine.      Labia:         Right: No tenderness or lesion.         Left: No tenderness or lesion.       Vagina: Normal. No vaginal discharge, tenderness or bleeding.      Cervix: No cervical motion tenderness, discharge or friability.      Adnexa:         Right: No tenderness or fullness.          Left: No tenderness or fullness.        Comments:  "Grade 1 cystocele, grade 1 rectocele   Musculoskeletal: Normal range of motion.   Skin:     General: Skin is warm and dry.   Neurological:      Mental Status: She is alert and oriented to person, place, and time.   Psychiatric:         Behavior: Behavior normal.         Judgment: Judgment normal.           Vitals:    03/02/21 1320   BP: 120/72   Weight: 68.5 kg (151 lb)   Height: 162.6 cm (64\")       Diagnoses and all orders for this visit:    1. Encounter for gynecological examination without abnormal finding (Primary)    2. Menopause  -     DEXA Bone Density Axial; Future    3. Cystocele with rectocele  -     Ambulatory Referral to Physical Therapy Pelvic Floor    4. Screening for cervical cancer  -     Liquid-based Pap Smear, Screening    Normal GYN exam. Will have lab work at Dr. Price's. Encouraged SBE, pt is aware how to do self breast exam and the importance of same. Discussed weight management and importance of maintaining a healthy weight. Discussed Vitamin D intake and the importance of adequate vitamin D for both Bone Health and a healthy immune system.  Given order for DEXA scan. Discussed Daily exercise and the importance of same, in regards to a healthy heart as well as helping to maintain her weight and improving her mental health.  Colonoscopy is up to date.  Mammogram will be scheduled at [facility] next week. Pap smear is done per ASCCP guidelines.  Referral for pelvic physical therapy placed at this time.  Discussed with patient that if her symptoms were to fail to improve she need to return for further management options.  All questions answered patient verbalized understanding of plan    Tomasa Mclean, DO       "

## 2021-03-04 LAB
GEN CATEG CVX/VAG CYTO-IMP: NORMAL
HPV I/H RISK 4 DNA CVX QL PROBE+SIG AMP: NOT DETECTED
LAB AP CASE REPORT: NORMAL
LAB AP GYN ADDITIONAL INFORMATION: NORMAL
PATH INTERP SPEC-IMP: NORMAL
STAT OF ADQ CVX/VAG CYTO-IMP: NORMAL

## 2021-03-08 ENCOUNTER — APPOINTMENT (OUTPATIENT)
Dept: MAMMOGRAPHY | Facility: HOSPITAL | Age: 51
End: 2021-03-08

## 2021-03-08 ENCOUNTER — APPOINTMENT (OUTPATIENT)
Dept: ULTRASOUND IMAGING | Facility: HOSPITAL | Age: 51
End: 2021-03-08

## 2021-03-08 ENCOUNTER — TELEPHONE (OUTPATIENT)
Dept: OBSTETRICS AND GYNECOLOGY | Facility: CLINIC | Age: 51
End: 2021-03-08

## 2021-03-08 DIAGNOSIS — R92.8 ABNORMAL MAMMOGRAM: Primary | ICD-10-CM

## 2021-03-08 NOTE — TELEPHONE ENCOUNTER
Pt called, was sched for mammo diagnostic today @ [facility], but had 2nd vaccine on 2-2-21 so wasn unable to get it. Her order expires on 3/11. She wants to r/s to 3/19. Please put in new mammo diagnostic order.

## 2021-03-19 ENCOUNTER — HOSPITAL ENCOUNTER (OUTPATIENT)
Dept: MAMMOGRAPHY | Facility: HOSPITAL | Age: 51
Discharge: HOME | End: 2021-03-19

## 2021-03-19 ENCOUNTER — HOSPITAL ENCOUNTER (OUTPATIENT)
Dept: ULTRASOUND IMAGING | Facility: HOSPITAL | Age: 51
Discharge: HOME | End: 2021-03-19

## 2021-03-19 ENCOUNTER — TREATMENT (OUTPATIENT)
Dept: PHYSICAL THERAPY | Facility: CLINIC | Age: 51
End: 2021-03-19

## 2021-03-19 DIAGNOSIS — R92.8 ABNORMAL MAMMOGRAM: ICD-10-CM

## 2021-03-19 DIAGNOSIS — M62.89 PELVIC FLOOR DYSFUNCTION: Primary | ICD-10-CM

## 2021-03-19 DIAGNOSIS — M54.41 CHRONIC RIGHT-SIDED LOW BACK PAIN WITH RIGHT-SIDED SCIATICA: ICD-10-CM

## 2021-03-19 DIAGNOSIS — N81.10 CYSTOCELE WITH RECTOCELE: ICD-10-CM

## 2021-03-19 DIAGNOSIS — N81.6 CYSTOCELE WITH RECTOCELE: ICD-10-CM

## 2021-03-19 DIAGNOSIS — N81.89 PELVIC FLOOR WEAKNESS: ICD-10-CM

## 2021-03-19 DIAGNOSIS — N39.3 SUI (STRESS URINARY INCONTINENCE, FEMALE): ICD-10-CM

## 2021-03-19 DIAGNOSIS — G89.29 CHRONIC RIGHT-SIDED LOW BACK PAIN WITH RIGHT-SIDED SCIATICA: ICD-10-CM

## 2021-03-19 PROCEDURE — 97162 PT EVAL MOD COMPLEX 30 MIN: CPT | Performed by: PHYSICAL THERAPIST

## 2021-03-19 PROCEDURE — G0279 TOMOSYNTHESIS, MAMMO: HCPCS

## 2021-03-19 PROCEDURE — 77066 DX MAMMO INCL CAD BI: CPT

## 2021-03-19 PROCEDURE — 76642 ULTRASOUND BREAST LIMITED: CPT

## 2021-03-19 PROCEDURE — 97110 THERAPEUTIC EXERCISES: CPT | Performed by: PHYSICAL THERAPIST

## 2021-03-19 NOTE — PROGRESS NOTES
Physical Therapy Initial Evaluation and Plan of Care    Patient: Vanessa Dunaway              : 1970  Today's Date: 3/19/2021  Referring practitioner: Tomasa Mclean DO  Date of Initial Visit: 3/19/2021  Patient seen for 1 sessions    Visit Diagnoses:    ICD-10-CM ICD-9-CM   1. Pelvic floor dysfunction  M62.89 618.83     Past Medical History:   Diagnosis Date   • Fibroids    • Frequent menstruation    • Menstruation, excessive    • Mitral valve prolapse    • Palpitations    • PONV (postoperative nausea and vomiting)     DIFFICULTY WAKING UP     Past Surgical History:   Procedure Laterality Date   • BREAST BIOPSY Bilateral    • CERVICAL LAMINECTOMY     • COLONOSCOPY     • D&C HYSTEROSCOPY ENDOMETRIAL ABLATION N/A 2017    Procedure: DILATATION AND CURETTAGE HYSTEROSCOPY NOVASURE ENDOMETRIAL ABLATION;  Surgeon: Lesvia Bermudez DO;  Location: [facility] OR;  Service:    • LASIK         SUBJECTIVE     Subjective Evaluation    History of Present Illness    Subjective comment: Some pain post inetercourse when being on top, uses pillows, etc to make intercourse comfortable. Chronic constipation, medicates to help, but has to strain to empty. Had prior back injury, and BM seems to help relieve this. She walks, but limits water intake prior secondary to UI.   Patient Occupation: Retired Women's Health Nurse Practitioner; Walks 6 miles, total gym; caring for elderly/ill parents, remodeling house     Prior C5-6 surgery, had recent back injury (2018), but unsure what caused it. Dry Needling helped the most, was having RLE radiculopathy. Drinks 4 bottles of water daily, one cup of coffee. Occasional  Mixed drink, soda. One birth son (6# 8 oz), one step daughter and step nephew. (2 grandchildren, 4 and 3 weeks). Endometrial ablation so currently no menstrual cycles.     Aggravating factors include urination, bowel movement and intercourse/sexual activity.   Bowel habits include BM's varies. Difficulties  with bowel include straining/pushing and use of medication PRN  Bladder habits include a daytime frequency of reports having nervous bladder and nighttime frequency of 0. Bladder difficulties include leakage and straining/pushing.  Sexual activity include currently sexually active.  Special concerns: not applicable    Verbal consent for internal pelvic floor muscle assessment.        OBJECTIVE     Objective          Tenderness     Right Hip   Tenderness in the PSIS.     Passive Range of Motion     Additional Passive Range of Motion Details  (B) WFL PROM hips    Strength/Myotome Testing     Additional Strength Details  Glut med: L 3+/5 R 4-/5    Muscle Activation   Patient unable to activate left transverse abdominals and right transverse abdominals.     Tests     Left Pelvic Girdle/Sacrum   Negative: thigh thrust.     Right Pelvic Girdle/Sacrum   Positive: thigh thrust.     Left Hip   Negative scour.     Right Hip   Negative scour.       PALPATION  no tenderness of guarding noted    MUSCLE ACTIVATION LEVATOR ANI  rdGrdrrdarddrderd:rd rd3rd Pelvic floor hold time: 1-2  Number of MVC contractions until fatigue: 0  Number of fast contractions before fatigue: 2  Able to complete up to 3/5 MMT with verbal and tactile cues.       PFDI-20: POPDI-6 8.35; CRAD-8 3; CHAR-6 8.25 (total: 19.5    Therapy Education/Self Care 35607   Details:    [facility] Code: [id number]   Given Home Exercise Program, postural retraining and symptom relief   Progress: New   Who provided to: Patient   Level of understanding Verbalized, Demonstrated and Teach back level of understanding   Timed Minutes      Therapeutic Exercises    80922 Comments   PFM activation in HL                     Timed Minutes 10       Total Timed Treatment:     10   mins  Total Time of Visit:            70  mins    ASSESSMENT/PLAN      Goals                                                                           Progress Note due by  4/21/21              STG by: 4 Weeks Comments Status    Pt will demonstrate proper PF 3+/5 or greater and TA activation in supine in order to progress with more functional strengthening.   New   Pt will be independent with initial HEP, in order to accelerate progress.    New   LTG by: 8 Weeks      Pt will be independent with HEP including core, hip and PF strengthening.    New   Pt will demonstrate 4/5 or greater hip abduction strength.     New   Pt will score 10 or less on the PFDI-20.    New   Pt will report 0-1 episodes of UI in a months' time.    New        Assessment & Plan     Assessment  Impairments: impaired physical strength and lacks appropriate home exercise program  Assessment details: Vanessa presents with complaints of MAGALY and POP. Upon assessment today she had decreased pelvic floor strength, endurance and coordination. She also has decreased hip and core strength placing increased strain on her PF. She also had a (+) SIJ involvement test with increased R LB tenderness. She demonstrated decreased stability with SLS, again placing increased strain on LB and PF.  Skilled PT needed to address these deficits and progress towards independent HEP.   Prognosis: good  Functional Limitations: lifting, walking, pulling and pushing  Plan  Therapy options: will be seen for skilled physical therapy services  Planned modality interventions: dry needling and TENS  Planned therapy interventions: abdominal trunk stabilization, manual therapy, motor coordination training, neuromuscular re-education, ADL retraining, soft tissue mobilization, spinal/joint mobilization, strengthening, stretching, therapeutic activities, transfer training, IADL retraining, joint mobilization, home exercise program and functional ROM exercises  Frequency: 1-4 times per month.  Duration in visits: 12  Duration in weeks: 16  Treatment plan discussed with: patient  Plan details: Initially we will work on PF strengthening, endurance and coordination. We will work on LB soft tissue restrictions  and stabilizing around her pelvis. We will then progress with core and hip strengthening, progressing HEP to reflect.         PT SIGNATURE: Kaykay Willis, PT DPT   DATE TREATMENT INITIATED: 3/19/2021      Initial Certification  Certification Period: 6/17/2021  I certify that the therapy services are furnished while this patient is under my care.  The services outlined above are required by this patient, and will be reviewed every 90 days.     PHYSICIAN:   Tomasa Mclean, DO__________________________________DATE: _________    Please sign and return via fax to [phone].   Thank you so much for letting us work with Vanessa. I appreciate your letting us work with your patients. If you have any questions or concerns, please don't hesitate to contact me.

## 2021-03-26 ENCOUNTER — TREATMENT (OUTPATIENT)
Dept: PHYSICAL THERAPY | Facility: CLINIC | Age: 51
End: 2021-03-26

## 2021-03-26 DIAGNOSIS — M54.41 CHRONIC RIGHT-SIDED LOW BACK PAIN WITH RIGHT-SIDED SCIATICA: ICD-10-CM

## 2021-03-26 DIAGNOSIS — N39.3 SUI (STRESS URINARY INCONTINENCE, FEMALE): ICD-10-CM

## 2021-03-26 DIAGNOSIS — M62.89 PELVIC FLOOR DYSFUNCTION: Primary | ICD-10-CM

## 2021-03-26 DIAGNOSIS — N81.10 CYSTOCELE WITH RECTOCELE: ICD-10-CM

## 2021-03-26 DIAGNOSIS — N81.89 PELVIC FLOOR WEAKNESS: ICD-10-CM

## 2021-03-26 DIAGNOSIS — G89.29 CHRONIC RIGHT-SIDED LOW BACK PAIN WITH RIGHT-SIDED SCIATICA: ICD-10-CM

## 2021-03-26 DIAGNOSIS — N81.6 CYSTOCELE WITH RECTOCELE: ICD-10-CM

## 2021-03-26 PROCEDURE — 97110 THERAPEUTIC EXERCISES: CPT | Performed by: PHYSICAL THERAPIST

## 2021-03-26 NOTE — PROGRESS NOTES
Physical Therapy Treatment Note    Patient: Vanessa Dunaway                                                                                     Visit Date: 3/26/2021  :     1970    Referring practitioner:    Tomasa Mclean DO  Date of Initial Visit:          Type: THERAPY  Noted: 3/19/2021    Patient seen for 2 sessions    Visit Diagnoses:    ICD-10-CM ICD-9-CM   1. Pelvic floor dysfunction  M62.89 618.83   2. Chronic right-sided low back pain with right-sided sciatica  M54.41 724.2    G89.29 724.3     338.29   3. MAGALY (stress urinary incontinence, female)  N39.3 625.6   4. Pelvic floor weakness  N81.89 618.89   5. Cystocele with rectocele  N81.10 618.01    N81.6 618.04     SUBJECTIVE     Subjective She reports having a hard time with the exercises. She feels like the bridge aggravated her back.     PAIN: 0/10         OBJECTIVE     Objective     Therapeutic Exercises    44861 Comments   Reviewed pillow prop/decrease bridge    TA/PFM activation X 10   Hip adduction with ball and PFM activation 2 X 10   Seated hip adduction with ball and PFM activation    Reviewed and updated HEP    Timed Minutes 30     Therapy Education/Self Care 00272   Details: Updated via [employer] Code:    Given Home Exercise Program and symptom relief   Progress: Reinforced   Who provided to: Patient   Level of understanding Verbalized   Timed Minutes      Total Timed Treatment:     30   mins  Total Time of Visit:             30  mins         ASSESSMENT/PLAN     GOALS  Goals                                                                           Progress Note due by  21              STG by: 4 Weeks Comments Status   Pt will demonstrate proper PF 3+/5 or greater and TA activation in supine in order to progress with more functional strengthening.   Ongoing   Pt will be independent with initial HEP, in order to accelerate progress.    Ongoing   LTG by: 8 Weeks       Pt will be independent with HEP including core,  hip and PF strengthening.    Ongoing   Pt will demonstrate 4/5 or greater hip abduction strength.     Ongoing   Pt will score 10 or less on the PFDI-20.    Ongoing   Pt will report 0-1 episodes of UI in a months' time.    Ongoing          Assessment/Plan     ASSESSMENT: No goals met today, this was her first visit since initial evaluation. She is still limited with proper TA and PF isolation. Treatment shortened secondary to mix up on time and only 30 minute time slot available.     PLAN: Progress with PFM activation and hip, core strengthening. Utilize biofeedback/sEMG.       Signature: Kaykay Willis, PT DPT

## 2022-02-18 ENCOUNTER — TELEPHONE (OUTPATIENT)
Dept: OBSTETRICS AND GYNECOLOGY | Facility: CLINIC | Age: 52
End: 2022-02-18

## 2022-02-18 DIAGNOSIS — Z12.31 SCREENING MAMMOGRAM, ENCOUNTER FOR: Primary | ICD-10-CM

## 2022-02-18 NOTE — TELEPHONE ENCOUNTER
Pt req order for brigitte to be sent to [facility] so she can have done in March before appt with dr gerber. Pt states she usually has to have diagnostic has had problems in the past

## 2022-03-21 ENCOUNTER — APPOINTMENT (OUTPATIENT)
Dept: MAMMOGRAPHY | Facility: HOSPITAL | Age: 52
End: 2022-03-21

## 2022-03-22 ENCOUNTER — HOSPITAL ENCOUNTER (OUTPATIENT)
Dept: MAMMOGRAPHY | Facility: HOSPITAL | Age: 52
Discharge: HOME | End: 2022-03-22
Admitting: OBSTETRICS & GYNECOLOGY

## 2022-03-22 DIAGNOSIS — Z12.31 SCREENING MAMMOGRAM, ENCOUNTER FOR: ICD-10-CM

## 2022-03-22 PROCEDURE — 77063 BREAST TOMOSYNTHESIS BI: CPT

## 2022-03-22 PROCEDURE — 77067 SCR MAMMO BI INCL CAD: CPT

## 2022-04-06 ENCOUNTER — OFFICE VISIT (OUTPATIENT)
Dept: OBSTETRICS AND GYNECOLOGY | Facility: CLINIC | Age: 52
End: 2022-04-06

## 2022-04-06 VITALS
DIASTOLIC BLOOD PRESSURE: 80 MMHG | BODY MASS INDEX: 26.46 KG/M2 | WEIGHT: 155 LBS | HEIGHT: 64 IN | SYSTOLIC BLOOD PRESSURE: 128 MMHG

## 2022-04-06 DIAGNOSIS — L65.9 HAIR LOSS: ICD-10-CM

## 2022-04-06 DIAGNOSIS — Z01.419 ENCOUNTER FOR GYNECOLOGICAL EXAMINATION WITHOUT ABNORMAL FINDING: Primary | ICD-10-CM

## 2022-04-06 PROCEDURE — 99396 PREV VISIT EST AGE 40-64: CPT | Performed by: OBSTETRICS & GYNECOLOGY

## 2022-04-06 RX ORDER — SACCHAROMYCES BOULARDII 250 MG
CAPSULE ORAL
COMMUNITY

## 2022-04-06 RX ORDER — ROSUVASTATIN CALCIUM 5 MG/1
TABLET, COATED ORAL
COMMUNITY
Start: 2022-03-29

## 2022-04-06 RX ORDER — ASPIRIN 81 MG/1
TABLET ORAL
COMMUNITY

## 2022-04-06 NOTE — PROGRESS NOTES
Subjective   Vanessa Dunaway is a 51 y.o. female  YOB: 1970        Chief Complaint   Patient presents with   • Gynecologic Exam     Patient here for yearly exam. Last exam was done 3/2/21, with pap and was normal. Mammo done at [facility] 3/22/22 and was normal. Patient has c/o possible hormone issues. Patient states she has increased acne and hair loss.        51 year old female  s/p ablation presents for annual examination. Patient reports that she has noticed increasing hair loss as well as facial acne. She reports that her hair loss has increased since the  of . She denies any hot flashes or night sweats. Her medical and surgical history are up to date. Her just had a recent mammogram that was BIRAD1 as well as a recent DEXA scan that showed improvement. She reports drinks occasional up to two to three times a week. She reports she is sexually active with her .       Allergies   Allergen Reactions   • Augmentin [Amoxicillin-Pot Clavulanate] Rash   • Ceclor [Cefaclor] Rash   • Erythromycin Rash   • Latex Rash     Notified Kely, in surgery scheduling, of latex allergy   • Penicillins Rash       Past Medical History:   Diagnosis Date   • Fibroids    • Frequent menstruation    • Menstruation, excessive    • Mitral valve prolapse    • Palpitations    • PONV (postoperative nausea and vomiting)     DIFFICULTY WAKING UP       Family History   Problem Relation Age of Onset   • Breast cancer Maternal Aunt    • Diabetes Father    • Heart disease Father    • Hypertension Father    • Liver cancer Father    • Thyroid cancer Father    • Diabetes Mother    • Heart disease Mother    • Hypertension Mother    • No Known Problems Brother    • No Known Problems Sister    • No Known Problems Son        Social History     Socioeconomic History   • Marital status:    Tobacco Use   • Smoking status: Never Smoker   • Smokeless tobacco: Never Used   Substance and Sexual Activity   • Alcohol use: Yes      Comment: occ   • Drug use: No   • Sexual activity: Yes     Partners: Male     Birth control/protection: Surgical         Current Outpatient Medications:   •  alendronate (FOSAMAX) 70 MG tablet, Take 70 mg by mouth 1 (One) Time Per Week., Disp: , Rfl:   •  ALPRAZolam (XANAX) 0.25 MG tablet, Take 0.25 mg by mouth 3 (Three) Times a Day As Needed., Disp: , Rfl:   •  aspirin 81 MG EC tablet, Take  by mouth., Disp: , Rfl:   •  B Complex-C (SUPER B COMPLEX PO), , Disp: , Rfl:   •  CALCIUM PO, 1200 mg tablet one tablet by mouth daily, Disp: , Rfl:   •  Cholecalciferol (VITAMIN D3) 125 MCG (5000 UT) capsule capsule, , Disp: , Rfl:   •  DHEA 50 MG tablet, Take 1 tablet by mouth., Disp: , Rfl:   •  Flaxseed, Linseed, (FLAXSEED OIL) 1000 MG capsule, , Disp: , Rfl:   •  Garlic (GARLIQUE PO), , Disp: , Rfl:   •  Magnesium 250 MG tablet, , Disp: , Rfl:   •  methocarbamol (ROBAXIN) 500 MG tablet, TAKE 1 TABLET BY MOUTH FOUR TIMES A DAY AS NEEDED FOR PAIN, Disp: , Rfl:   •  Misc Natural Products (COLON CLEANSE PO), , Disp: , Rfl:   •  Multiple Vitamins-Minerals (MULTIVITAMIN ADULT PO), Take  by mouth., Disp: , Rfl:   •  nabumetone (RELAFEN) 500 MG tablet, Take 500 mg by mouth 2 (Two) Times a Day As Needed. for pain, Disp: , Rfl:   •  Plant Sterols and Stanols 450 MG capsule, Take  by mouth., Disp: , Rfl:   •  Probiotic Product (PROBIOTIC DAILY PO), Take  by mouth., Disp: , Rfl:   •  rosuvastatin (CRESTOR) 5 MG tablet, , Disp: , Rfl:   •  saccharomyces boulardii (FLORASTOR) 250 MG capsule, once a day, Disp: , Rfl:   •  Zinc 50 MG tablet, , Disp: , Rfl:     No LMP recorded. Patient has had an ablation.    Sexual History:         Could not be calculated    Past Surgical History:   Procedure Laterality Date   • BREAST BIOPSY Right    • CERVICAL LAMINECTOMY     • COLONOSCOPY     • D & C HYSTEROSCOPY ENDOMETRIAL ABLATION N/A 05/05/2017    Procedure: DILATATION AND CURETTAGE HYSTEROSCOPY NOVASURE ENDOMETRIAL ABLATION;  Surgeon: Lesvia  Andrey Bermudez DO;  Location: [facility] OR;  Service:    • LASIK         Review of Systems   Constitutional: Negative for activity change and unexpected weight loss.   HENT: Negative for congestion.    Cardiovascular: Positive for chest pain (had a cardiac work up that was negative.).   Gastrointestinal: Negative for blood in stool, constipation and diarrhea.   Endocrine: Negative for cold intolerance and heat intolerance.   Genitourinary: Negative for dyspareunia and pelvic pain.   Musculoskeletal: Positive for back pain (osteoarthritis) and neck pain.   Skin: Negative for rash.   Neurological: Negative for dizziness and headache.   Psychiatric/Behavioral: Positive for sleep disturbance. The patient is nervous/anxious.        Objective   Physical Exam  Vitals and nursing note reviewed. Exam conducted with a chaperone present.   Constitutional:       General: She is not in acute distress.     Appearance: She is well-developed.   HENT:      Head: Normocephalic and atraumatic.   Eyes:      General:         Right eye: No discharge.         Left eye: No discharge.      Conjunctiva/sclera: Conjunctivae normal.   Cardiovascular:      Rate and Rhythm: Normal rate and regular rhythm.      Heart sounds: No murmur heard.  Pulmonary:      Effort: Pulmonary effort is normal.      Breath sounds: Normal breath sounds.   Chest:   Breasts:      Right: No inverted nipple or mass.      Left: No inverted nipple or mass.       Abdominal:      General: There is no distension.      Palpations: Abdomen is soft.      Tenderness: There is no abdominal tenderness.   Genitourinary:     Exam position: Supine.      Labia:         Right: No tenderness or lesion.         Left: No tenderness or lesion.       Vagina: Normal. No vaginal discharge, tenderness or bleeding.      Cervix: No cervical motion tenderness, discharge or friability.      Adnexa:         Right: No tenderness or fullness.          Left: No tenderness or fullness.    "  Musculoskeletal:         General: Normal range of motion.      Cervical back: Normal range of motion and neck supple.   Skin:     General: Skin is warm and dry.   Neurological:      Mental Status: She is alert and oriented to person, place, and time.   Psychiatric:         Behavior: Behavior normal.         Judgment: Judgment normal.           Vitals:    04/06/22 1343   BP: 128/80   Weight: 70.3 kg (155 lb)   Height: 162.6 cm (64\")       Diagnoses and all orders for this visit:    1. Encounter for gynecological examination without abnormal finding (Primary)    2. Hair loss  -     Testosterone  -     Progesterone  -     Luteinizing Hormone  -     Estradiol  -     Follicle Stimulating Hormone  -     DHEA  -     TSH  -     CBC & Differential      Encouraged SBE, pt is aware how to do self breast exam and the importance of same.   Discussed weight management and importance of maintaining a healthy weight.   Colonoscopy is up to date.    Mammogram and Pap smear are up-to-date at this time.  Discussed with patient causes of hair loss.  Labs ordered with results to follow.  We will base results pending labs.  All questions answered patient verbalized understanding plan.    Tomasa Mclean, DO       "

## 2022-04-17 LAB
BASOPHILS # BLD AUTO: 0.1 X10E3/UL (ref 0–0.2)
BASOPHILS NFR BLD AUTO: 1 %
DHEA SERPL-MCNC: 167 NG/DL (ref 31–701)
EOSINOPHIL # BLD AUTO: 0.1 X10E3/UL (ref 0–0.4)
EOSINOPHIL NFR BLD AUTO: 2 %
ERYTHROCYTE [DISTWIDTH] IN BLOOD BY AUTOMATED COUNT: 12.2 % (ref 11.7–15.4)
ESTRADIOL SERPL-MCNC: 65.5 PG/ML
FSH SERPL-ACNC: 42.1 MIU/ML
HCT VFR BLD AUTO: 41.8 % (ref 34–46.6)
HGB BLD-MCNC: 13.9 G/DL (ref 11.1–15.9)
IMM GRANULOCYTES # BLD AUTO: 0 X10E3/UL (ref 0–0.1)
IMM GRANULOCYTES NFR BLD AUTO: 0 %
LH SERPL-ACNC: 42.7 MIU/ML
LYMPHOCYTES # BLD AUTO: 1.7 X10E3/UL (ref 0.7–3.1)
LYMPHOCYTES NFR BLD AUTO: 31 %
MCH RBC QN AUTO: 30.1 PG (ref 26.6–33)
MCHC RBC AUTO-ENTMCNC: 33.3 G/DL (ref 31.5–35.7)
MCV RBC AUTO: 91 FL (ref 79–97)
MONOCYTES # BLD AUTO: 0.4 X10E3/UL (ref 0.1–0.9)
MONOCYTES NFR BLD AUTO: 8 %
NEUTROPHILS # BLD AUTO: 3.1 X10E3/UL (ref 1.4–7)
NEUTROPHILS NFR BLD AUTO: 58 %
PLATELET # BLD AUTO: 336 X10E3/UL (ref 150–450)
PROGEST SERPL-MCNC: <0.1 NG/ML
RBC # BLD AUTO: 4.62 X10E6/UL (ref 3.77–5.28)
TESTOST SERPL-MCNC: 15 NG/DL (ref 4–50)
TSH SERPL DL<=0.005 MIU/L-ACNC: 4.31 UIU/ML (ref 0.45–4.5)
WBC # BLD AUTO: 5.4 X10E3/UL (ref 3.4–10.8)

## 2023-02-16 ENCOUNTER — TELEPHONE (OUTPATIENT)
Dept: OBSTETRICS AND GYNECOLOGY | Facility: CLINIC | Age: 53
End: 2023-02-16
Payer: COMMERCIAL

## 2023-02-16 DIAGNOSIS — Z12.31 SCREENING MAMMOGRAM, ENCOUNTER FOR: Primary | ICD-10-CM

## 2023-02-16 NOTE — TELEPHONE ENCOUNTER
Caller: Vanessa Dunaway    Relationship: Self    Best call back number: [phone]     What is the best time to reach you: ANYTIME    What was the call regarding: PT SCHEDULED HER ANNUAL FOR April 11, 2023 @ 8:00AM WITH DR. SMITH AND IS REQUESTING HER MAMMO BE SCHEDULED AS WELL    Do you require a callback: YES, FOR SCHEDULING. OKAY TO [facility]      ”

## 2023-02-16 NOTE — TELEPHONE ENCOUNTER
Mammogram order placed.  Pt will need to discuss with scheduling if there is a mammogram opening on the same date.  [employer] message sent to pt to contact scheduling for mammogram.

## 2023-04-11 ENCOUNTER — OFFICE VISIT (OUTPATIENT)
Dept: OBSTETRICS AND GYNECOLOGY | Facility: CLINIC | Age: 53
End: 2023-04-11
Payer: COMMERCIAL

## 2023-04-11 ENCOUNTER — HOSPITAL ENCOUNTER (OUTPATIENT)
Dept: MAMMOGRAPHY | Facility: HOSPITAL | Age: 53
Discharge: HOME | End: 2023-04-11
Admitting: OBSTETRICS & GYNECOLOGY
Payer: COMMERCIAL

## 2023-04-11 VITALS
DIASTOLIC BLOOD PRESSURE: 70 MMHG | BODY MASS INDEX: 25.78 KG/M2 | SYSTOLIC BLOOD PRESSURE: 122 MMHG | HEIGHT: 64 IN | WEIGHT: 151 LBS

## 2023-04-11 DIAGNOSIS — Z12.31 SCREENING MAMMOGRAM, ENCOUNTER FOR: ICD-10-CM

## 2023-04-11 DIAGNOSIS — Z01.419 ENCOUNTER FOR GYNECOLOGICAL EXAMINATION WITHOUT ABNORMAL FINDING: Primary | ICD-10-CM

## 2023-04-11 PROCEDURE — 99396 PREV VISIT EST AGE 40-64: CPT | Performed by: OBSTETRICS & GYNECOLOGY

## 2023-04-11 PROCEDURE — 77067 SCR MAMMO BI INCL CAD: CPT

## 2023-04-11 PROCEDURE — 77063 BREAST TOMOSYNTHESIS BI: CPT

## 2023-04-11 RX ORDER — TIZANIDINE 4 MG/1
1 TABLET ORAL 3 TIMES DAILY
COMMUNITY
Start: 2023-03-23

## 2023-04-11 RX ORDER — MELOXICAM 15 MG/1
TABLET ORAL
COMMUNITY
Start: 2023-03-23

## 2023-04-11 RX ORDER — ROSUVASTATIN CALCIUM 10 MG/1
10 TABLET, COATED ORAL
COMMUNITY
Start: 2023-04-05

## 2023-04-11 NOTE — PROGRESS NOTES
Subjective   Vanessa Dunaway is a 52 y.o. female  YOB: 1970        Chief Complaint   Patient presents with   • Gynecologic Exam     Patient here for yearly exam. Last exam was done 22, last pap done 3/2/21 and was normal. Patient had mammogram done today. Patient voices no complaints or concerns.        52 year old female  s/p ablation presents for annual examination. She denies any vaginal bleeding since her ablation. She denies any cramping at this time. Her last pap smear was WILLIAM with negative HPV. She denies any history of abnormal pap smears. She had a mammogram this morning. She voices no new complaints at this time. Her medical and surgical history are up to date. She denies drinking, smoking or drug use. She is sexually active with her  without complaints. She reports a family history of breast cancer in [place] aunt diagnosed at age 60.       Allergies   Allergen Reactions   • Augmentin [Amoxicillin-Pot Clavulanate] Rash   • Ceclor [Cefaclor] Rash   • Erythromycin Rash   • Latex Rash     Notified Kely, in surgery scheduling, of latex allergy   • Penicillins Rash       Past Medical History:   Diagnosis Date   • Fibroids    • Frequent menstruation    • Menstruation, excessive    • Mitral valve prolapse    • Palpitations    • PONV (postoperative nausea and vomiting)     DIFFICULTY WAKING UP       Family History   Problem Relation Age of Onset   • Breast cancer Maternal Aunt    • Diabetes Father    • Heart disease Father    • Hypertension Father    • Liver cancer Father    • Thyroid cancer Father    • Diabetes Mother    • Heart disease Mother    • Hypertension Mother    • No Known Problems Brother    • No Known Problems Sister    • No Known Problems Son        Social History     Socioeconomic History   • Marital status:    Tobacco Use   • Smoking status: Never   • Smokeless tobacco: Never   Substance and Sexual Activity   • Alcohol use: Yes     Comment: occ   • Drug  use: No   • Sexual activity: Yes     Partners: Male     Birth control/protection: Surgical         Current Outpatient Medications:   •  alendronate (FOSAMAX) 70 MG tablet, Take 1 tablet by mouth 1 (One) Time Per Week., Disp: , Rfl:   •  ALPRAZolam (XANAX) 0.25 MG tablet, Take 1 tablet by mouth 3 (Three) Times a Day As Needed., Disp: , Rfl:   •  B Complex-C (SUPER B COMPLEX PO), , Disp: , Rfl:   •  CALCIUM PO, 1200 mg tablet one tablet by mouth daily, Disp: , Rfl:   •  Cholecalciferol (VITAMIN D3) 125 MCG (5000 UT) capsule capsule, , Disp: , Rfl:   •  Flaxseed, Linseed, (FLAXSEED OIL) 1000 MG capsule, , Disp: , Rfl:   •  Magnesium 250 MG tablet, , Disp: , Rfl:   •  meloxicam (MOBIC) 15 MG tablet, TAKE 1 TABLET AS NEEDED ONCE A DAY, Disp: , Rfl:   •  Misc Natural Products (COLON CLEANSE PO), , Disp: , Rfl:   •  Multiple Vitamins-Minerals (MULTIVITAMIN ADULT PO), Take  by mouth., Disp: , Rfl:   •  Plant Sterols and Stanols 450 MG capsule, Take  by mouth., Disp: , Rfl:   •  Probiotic Product (PROBIOTIC DAILY PO), Take  by mouth., Disp: , Rfl:   •  rosuvastatin (CRESTOR) 10 MG tablet, Take 1 tablet by mouth every night at bedtime., Disp: , Rfl:   •  saccharomyces boulardii (FLORASTOR) 250 MG capsule, once a day, Disp: , Rfl:   •  tiZANidine (ZANAFLEX) 4 MG tablet, Take 1 tablet by mouth 3 (Three) Times a Day., Disp: , Rfl:   •  Zinc 50 MG tablet, , Disp: , Rfl:     No LMP recorded. Patient has had an ablation.    Sexual History:         Could not be calculated    Past Surgical History:   Procedure Laterality Date   • BREAST BIOPSY Right    • CERVICAL LAMINECTOMY     • COLONOSCOPY     • D & C HYSTEROSCOPY ENDOMETRIAL ABLATION N/A 05/05/2017    Procedure: DILATATION AND CURETTAGE HYSTEROSCOPY NOVASURE ENDOMETRIAL ABLATION;  Surgeon: Lesvia Bermudez DO;  Location: [facility];  Service:    • LASIK         Review of Systems   Constitutional: Negative for activity change, diaphoresis and unexpected weight loss.   HENT:  Negative for congestion.    Cardiovascular: Negative for chest pain.   Gastrointestinal: Negative for blood in stool, constipation and diarrhea.   Endocrine: Negative for cold intolerance and heat intolerance.   Genitourinary: Negative for dyspareunia, pelvic pain and vaginal discharge.   Musculoskeletal: Positive for arthralgias, back pain and neck pain.   Skin: Negative for rash.   Neurological: Negative for dizziness and headache.   Psychiatric/Behavioral: Negative for sleep disturbance. The patient is not nervous/anxious.        Objective   Physical Exam  Vitals and nursing note reviewed. Exam conducted with a chaperone present.   Constitutional:       General: She is not in acute distress.     Appearance: She is well-developed.   HENT:      Head: Normocephalic and atraumatic.   Cardiovascular:      Rate and Rhythm: Normal rate and regular rhythm.      Heart sounds: No murmur heard.  Pulmonary:      Effort: Pulmonary effort is normal.      Breath sounds: Normal breath sounds.   Chest:   Breasts:     Right: No inverted nipple or mass.      Left: No inverted nipple or mass.   Abdominal:      General: There is no distension.      Palpations: Abdomen is soft.      Tenderness: There is no abdominal tenderness.   Genitourinary:     Exam position: Supine.      Labia:         Right: No tenderness or lesion.         Left: No tenderness or lesion.       Vagina: Normal. No vaginal discharge, tenderness or bleeding.      Cervix: No cervical motion tenderness, discharge or friability.      Adnexa:         Right: No tenderness or fullness.          Left: No tenderness or fullness.     Musculoskeletal:         General: Normal range of motion.      Cervical back: Normal range of motion and neck supple.   Skin:     General: Skin is warm and dry.   Neurological:      Mental Status: She is alert and oriented to person, place, and time.   Psychiatric:         Behavior: Behavior normal.         Judgment: Judgment normal.  "          Vitals:    04/11/23 1022   BP: 122/70   Weight: 68.5 kg (151 lb)   Height: 162.6 cm (64\")       Diagnoses and all orders for this visit:    1. Encounter for gynecological examination without abnormal finding (Primary)    Normal GYN exam.   Will have lab work at PCP.   Encouraged SBE, pt is aware how to do self breast exam and the importance of same.   Discussed weight management and importance of maintaining a healthy weight.   Discussed Daily exercise and the importance of same, in regards to a healthy heart as well as helping to maintain her weight and improving her mental health.    Mammogram was done today   Pap smear is not indicated   RTC in 1 year or sooner as clinically indicated.     Tomasa Mclean, DO       "

## 2023-08-22 ENCOUNTER — HOSPITAL ENCOUNTER (OUTPATIENT)
Dept: MRI IMAGING | Facility: HOSPITAL | Age: 53
Discharge: HOME | End: 2023-08-22
Admitting: INTERNAL MEDICINE
Payer: COMMERCIAL

## 2023-08-22 DIAGNOSIS — M54.12 RADICULOPATHY, CERVICAL: ICD-10-CM

## 2023-08-22 PROCEDURE — 72141 MRI NECK SPINE W/O DYE: CPT

## 2023-11-20 ENCOUNTER — OFFICE VISIT (OUTPATIENT)
Dept: SURGERY | Facility: CLINIC | Age: 53
End: 2023-11-20
Payer: COMMERCIAL

## 2023-11-20 VITALS
SYSTOLIC BLOOD PRESSURE: 151 MMHG | OXYGEN SATURATION: 99 % | HEIGHT: 65 IN | WEIGHT: 145 LBS | BODY MASS INDEX: 24.16 KG/M2 | DIASTOLIC BLOOD PRESSURE: 81 MMHG | HEART RATE: 77 BPM

## 2023-11-20 DIAGNOSIS — K80.50 BILIARY COLIC: Primary | ICD-10-CM

## 2023-11-20 PROCEDURE — 99203 OFFICE O/P NEW LOW 30 MIN: CPT | Performed by: STUDENT IN AN ORGANIZED HEALTH CARE EDUCATION/TRAINING PROGRAM

## 2023-11-20 RX ORDER — UBIDECARENONE 100 MG
100 CAPSULE ORAL DAILY
COMMUNITY

## 2023-11-20 RX ORDER — KRILL/OM-3/DHA/EPA/PHOSPHO/AST 500MG-86MG
CAPSULE ORAL
COMMUNITY

## 2023-11-20 RX ORDER — INDOCYANINE GREEN AND WATER 25 MG
3.75 KIT INJECTION ONCE
OUTPATIENT
Start: 2023-11-20 | End: 2023-11-20

## 2023-11-20 RX ORDER — TETRACYCLINE HCL 500 MG
450 CAPSULE ORAL
COMMUNITY

## 2023-11-20 RX ORDER — HEPARIN SODIUM 5000 [USP'U]/ML
5000 INJECTION, SOLUTION INTRAVENOUS; SUBCUTANEOUS ONCE
OUTPATIENT
Start: 2023-11-20 | End: 2023-11-20

## 2023-11-20 RX ORDER — OMEPRAZOLE 40 MG/1
40 CAPSULE, DELAYED RELEASE ORAL DAILY
COMMUNITY

## 2023-11-20 NOTE — H&P (VIEW-ONLY)
Office New Patient History and Physical:     Referring Provider: No ref. provider found    No chief complaint on file.      Subjective .     History of present illness:  Vanessa Dunaway is a 53 y.o. female who presents for a gallbladder consultation. On Halloween she had an episode of nausea, epigastric pain, and emesis. She had khaki colored stools.  She reports intermittent attacks over the past several months. No fevers. No yellowing of the skin or eyes.    Bmi is 24. She is not on blood thinners. No surgeries on her abdomen in the past. Non-smoker.     History  Past Medical History:   Diagnosis Date    Fibroids     Frequent menstruation     Menstruation, excessive     Mitral valve prolapse     Palpitations     PONV (postoperative nausea and vomiting)     DIFFICULTY WAKING UP   ,   Past Surgical History:   Procedure Laterality Date    BREAST BIOPSY Right     CERVICAL LAMINECTOMY      COLONOSCOPY      D & C HYSTEROSCOPY ENDOMETRIAL ABLATION N/A 05/05/2017    Procedure: DILATATION AND CURETTAGE HYSTEROSCOPY NOVASURE ENDOMETRIAL ABLATION;  Surgeon: Lesvia Bermudez DO;  Location: [facility] OR;  Service:     LASIK     ,   Family History   Problem Relation Age of Onset    Breast cancer Maternal Aunt     Diabetes Father     Heart disease Father     Hypertension Father     Liver cancer Father     Thyroid cancer Father     Diabetes Mother     Heart disease Mother     Hypertension Mother     No Known Problems Brother     No Known Problems Sister     No Known Problems Son    ,   Social History     Tobacco Use    Smoking status: Never    Smokeless tobacco: Never   Substance Use Topics    Alcohol use: Yes     Comment: occ    Drug use: No   , (Not in a hospital admission)   and Allergies:  Augmentin [amoxicillin-pot clavulanate], Ceclor [cefaclor], Erythromycin, Latex, and Penicillins    Current Outpatient Medications:     alendronate (FOSAMAX) 70 MG tablet, Take 1 tablet by mouth 1 (One) Time Per Week., Disp: , Rfl:      "ALPRAZolam (XANAX) 0.25 MG tablet, Take 1 tablet by mouth 3 (Three) Times a Day As Needed., Disp: , Rfl:     Apple Cider Vinegar 500 MG tablet, Take 450 mg by mouth., Disp: , Rfl:     B Complex-C (SUPER B COMPLEX PO), , Disp: , Rfl:     CALCIUM PO, 1200 mg tablet one tablet by mouth daily, Disp: , Rfl:     Cholecalciferol (VITAMIN D3) 125 MCG (5000 UT) capsule capsule, , Disp: , Rfl:     CITRUS BERGAMOT PO, Take 1,000 mg by mouth., Disp: , Rfl:     coenzyme Q10 100 MG capsule, Take 1 capsule by mouth Daily., Disp: , Rfl:     Flaxseed, Linseed, (FLAXSEED OIL) 1000 MG capsule, , Disp: , Rfl:     HAWTHORN BERRY PO, Take  by mouth., Disp: , Rfl:     Krill Oil 500 MG capsule, Take  by mouth., Disp: , Rfl:     Magnesium 250 MG tablet, , Disp: , Rfl:     meloxicam (MOBIC) 15 MG tablet, TAKE 1 TABLET AS NEEDED ONCE A DAY, Disp: , Rfl:     Misc Natural Products (COLON CLEANSE PO), , Disp: , Rfl:     Multiple Vitamins-Minerals (MULTIVITAMIN ADULT PO), Take  by mouth., Disp: , Rfl:     Multiple Vitamins-Minerals (PRESERVISION AREDS 2 PO), Take  by mouth., Disp: , Rfl:     omeprazole (priLOSEC) 40 MG capsule, Take 1 capsule by mouth Daily., Disp: , Rfl:     Plant Sterols and Stanols 450 MG capsule, Take  by mouth., Disp: , Rfl:     Probiotic Product (PROBIOTIC DAILY PO), Take  by mouth., Disp: , Rfl:     rosuvastatin (CRESTOR) 10 MG tablet, Take 1 tablet by mouth every night at bedtime., Disp: , Rfl:     saccharomyces boulardii (FLORASTOR) 250 MG capsule, once a day, Disp: , Rfl:     tiZANidine (ZANAFLEX) 4 MG tablet, Take 1 tablet by mouth 3 (Three) Times a Day., Disp: , Rfl:     Vitamin D-Vitamin K (VITAMIN K2-VITAMIN D3 PO), Take  by mouth., Disp: , Rfl:     Zinc 50 MG tablet, , Disp: , Rfl:     Objective     Vital Signs   /81 (BP Location: Left arm, Patient Position: Sitting, Cuff Size: Adult)   Pulse 77   Ht 165.1 cm (65\")   Wt 65.8 kg (145 lb)   SpO2 99%   BMI 24.13 kg/m²      Physical Exam:  General appearance " - alert, well appearing, and in no distress  Mental status - alert, oriented to person, place, and time  Eyes - pupils equal and reactive, extraocular eye movements intact  Neck - supple, no significant adenopathy  Chest - no tachypnea, retractions or cyanosis  Heart - normal rate and regular rhythm  Abdomen - soft, nontender, nondistended, no masses or organomegaly  Neurological - alert, oriented, normal speech, no focal findings or movement disorder noted    Physical Exam  Abdominal:               Results Review:     The following data was reviewed by: Heather Lange MD on 11/20/2023:    RADIOLOGY - SCAN - US ABDOMINAL_[place] [facility]_11/3/23 (11/06/2023)   US from [place]: 1. Probable 6 mm hemangioma within the right lobe of the liver. 2. Sludge filled gallbladder.   PROGRESS NOTES - SCAN - PN_DR. GLENN FRANKLIN_11/1/23 (11/06/2023)     Assessment & Plan       Diagnoses and all orders for this visit:    1. Biliary colic (Primary)  -     Case Request; Standing  -     XR chest 1 vw; Future  -     ECG 12 Lead; Future  -     ceFAZolin (ANCEF) 2,000 mg in sodium chloride 0.9 % 100 mL IVPB  -     indocyanine green (IC-GREEN) injection 3.75 mg  -     heparin (porcine) 5000 UNIT/ML injection 5,000 Units  -     CBC & Differential; Future  -     Comprehensive Metabolic Panel; Future  -     Case Request    Other orders  -     Follow Anesthesia Guidelines / Protocol; Future  -     Follow Anesthesia Guidelines / Protocol; Standing  -     Verify / Perform Chlorhexidine Skin Prep; Standing  -     Verify / Perform Chlorhexidine Skin Prep if Indicated (If Not Already Completed); Standing  -     Obtain Informed Consent; Future  -     Provide NPO Instructions to Patient; Future  -     Chlorhexidine Skin Prep; Future  -     Notify physician (specify); Standing  -     Instructions on coughing, deep breathing, and incentive spirometry.; Standing  -     Oxygen Therapy-; Standing         Vanessa Dunaway is a 53 y.o. female  with biliary colic 2/2 cholelithiasis. History and imaging above are consistent with this diagnosis. I did  the patient that there is a small chance that cholecystectomy does not completely resolve the pain, and that if this is the case, we will refer the patient to GI for an EGD. However, the symptoms including their nature, timing and duration are most consistent with biliary colic. I reviewed the gallbladder anatomy with the patient, and after a thorough discussion of risks (including bleeding, infection, bile leak, damage to surrounding structures including the common bile duct, and risks of anesthesia) and benefits, the patient wishes to proceed with laparoscopic robotic assisted cholecystectomy, possible cholangiogram. [[I will perform a trucut liver biopsy at the time of surgery due to his history of fatty liver disease.]] The patient has an appointment for pre-op work up including EKG, CXR, CMP and CBC. The patient is currently scheduled for laparoscopic robotic assisted cholecystectomy, with ICG guided cholangiogram possible fluoroscopy on 12/4/23.    This is a new problem. I have reviewed the US and note above. I have ordered CBC, CMP, CXR and EKG. She is booked for major surgery.     I also discussed with the patient post operative pain management including multimodal pain control utilizing Tylenol, ibuprofen, and Roxicodone for breakthrough pain. I will plan to give the patient 10 tabs of 5mg Roxicodone post operatively for breakthrough pain    BMI is within normal parameters. No other follow-up for BMI required.      Heather Lange MD  11/20/23  10:47 CST

## 2023-11-20 NOTE — PATIENT INSTRUCTIONS
Surgery Scheduled 12/04/2023; Arrive @ 1:00 pm  Pre-work: 11/29/2023 @ 9:00 am (Eat and drink like normal on this day)  NPO after midnight the night before surgery  Check in at the main registration for pre-work and surgery

## 2023-11-20 NOTE — PROGRESS NOTES
Office New Patient History and Physical:     Referring Provider: No ref. provider found    No chief complaint on file.      Subjective .     History of present illness:  Vanessa Dunaway is a 53 y.o. female who presents for a gallbladder consultation. On Halloween she had an episode of nausea, epigastric pain, and emesis. She had khaki colored stools.  She reports intermittent attacks over the past several months. No fevers. No yellowing of the skin or eyes.    Bmi is 24. She is not on blood thinners. No surgeries on her abdomen in the past. Non-smoker.     History  Past Medical History:   Diagnosis Date    Fibroids     Frequent menstruation     Menstruation, excessive     Mitral valve prolapse     Palpitations     PONV (postoperative nausea and vomiting)     DIFFICULTY WAKING UP   ,   Past Surgical History:   Procedure Laterality Date    BREAST BIOPSY Right     CERVICAL LAMINECTOMY      COLONOSCOPY      D & C HYSTEROSCOPY ENDOMETRIAL ABLATION N/A 05/05/2017    Procedure: DILATATION AND CURETTAGE HYSTEROSCOPY NOVASURE ENDOMETRIAL ABLATION;  Surgeon: Lesvia Bermudez DO;  Location: [facility] OR;  Service:     LASIK     ,   Family History   Problem Relation Age of Onset    Breast cancer Maternal Aunt     Diabetes Father     Heart disease Father     Hypertension Father     Liver cancer Father     Thyroid cancer Father     Diabetes Mother     Heart disease Mother     Hypertension Mother     No Known Problems Brother     No Known Problems Sister     No Known Problems Son    ,   Social History     Tobacco Use    Smoking status: Never    Smokeless tobacco: Never   Substance Use Topics    Alcohol use: Yes     Comment: occ    Drug use: No   , (Not in a hospital admission)   and Allergies:  Augmentin [amoxicillin-pot clavulanate], Ceclor [cefaclor], Erythromycin, Latex, and Penicillins    Current Outpatient Medications:     alendronate (FOSAMAX) 70 MG tablet, Take 1 tablet by mouth 1 (One) Time Per Week., Disp: , Rfl:      "ALPRAZolam (XANAX) 0.25 MG tablet, Take 1 tablet by mouth 3 (Three) Times a Day As Needed., Disp: , Rfl:     Apple Cider Vinegar 500 MG tablet, Take 450 mg by mouth., Disp: , Rfl:     B Complex-C (SUPER B COMPLEX PO), , Disp: , Rfl:     CALCIUM PO, 1200 mg tablet one tablet by mouth daily, Disp: , Rfl:     Cholecalciferol (VITAMIN D3) 125 MCG (5000 UT) capsule capsule, , Disp: , Rfl:     CITRUS BERGAMOT PO, Take 1,000 mg by mouth., Disp: , Rfl:     coenzyme Q10 100 MG capsule, Take 1 capsule by mouth Daily., Disp: , Rfl:     Flaxseed, Linseed, (FLAXSEED OIL) 1000 MG capsule, , Disp: , Rfl:     HAWTHORN BERRY PO, Take  by mouth., Disp: , Rfl:     Krill Oil 500 MG capsule, Take  by mouth., Disp: , Rfl:     Magnesium 250 MG tablet, , Disp: , Rfl:     meloxicam (MOBIC) 15 MG tablet, TAKE 1 TABLET AS NEEDED ONCE A DAY, Disp: , Rfl:     Misc Natural Products (COLON CLEANSE PO), , Disp: , Rfl:     Multiple Vitamins-Minerals (MULTIVITAMIN ADULT PO), Take  by mouth., Disp: , Rfl:     Multiple Vitamins-Minerals (PRESERVISION AREDS 2 PO), Take  by mouth., Disp: , Rfl:     omeprazole (priLOSEC) 40 MG capsule, Take 1 capsule by mouth Daily., Disp: , Rfl:     Plant Sterols and Stanols 450 MG capsule, Take  by mouth., Disp: , Rfl:     Probiotic Product (PROBIOTIC DAILY PO), Take  by mouth., Disp: , Rfl:     rosuvastatin (CRESTOR) 10 MG tablet, Take 1 tablet by mouth every night at bedtime., Disp: , Rfl:     saccharomyces boulardii (FLORASTOR) 250 MG capsule, once a day, Disp: , Rfl:     tiZANidine (ZANAFLEX) 4 MG tablet, Take 1 tablet by mouth 3 (Three) Times a Day., Disp: , Rfl:     Vitamin D-Vitamin K (VITAMIN K2-VITAMIN D3 PO), Take  by mouth., Disp: , Rfl:     Zinc 50 MG tablet, , Disp: , Rfl:     Objective     Vital Signs   /81 (BP Location: Left arm, Patient Position: Sitting, Cuff Size: Adult)   Pulse 77   Ht 165.1 cm (65\")   Wt 65.8 kg (145 lb)   SpO2 99%   BMI 24.13 kg/m²      Physical Exam:  General appearance " - alert, well appearing, and in no distress  Mental status - alert, oriented to person, place, and time  Eyes - pupils equal and reactive, extraocular eye movements intact  Neck - supple, no significant adenopathy  Chest - no tachypnea, retractions or cyanosis  Heart - normal rate and regular rhythm  Abdomen - soft, nontender, nondistended, no masses or organomegaly  Neurological - alert, oriented, normal speech, no focal findings or movement disorder noted    Physical Exam  Abdominal:               Results Review:     The following data was reviewed by: Heather Lange MD on 11/20/2023:    RADIOLOGY - SCAN - US ABDOMINAL_[place] [facility]_11/3/23 (11/06/2023)   US from [place]: 1. Probable 6 mm hemangioma within the right lobe of the liver. 2. Sludge filled gallbladder.   PROGRESS NOTES - SCAN - PN_DR. GLENN FRANKLIN_11/1/23 (11/06/2023)     Assessment & Plan       Diagnoses and all orders for this visit:    1. Biliary colic (Primary)  -     Case Request; Standing  -     XR chest 1 vw; Future  -     ECG 12 Lead; Future  -     ceFAZolin (ANCEF) 2,000 mg in sodium chloride 0.9 % 100 mL IVPB  -     indocyanine green (IC-GREEN) injection 3.75 mg  -     heparin (porcine) 5000 UNIT/ML injection 5,000 Units  -     CBC & Differential; Future  -     Comprehensive Metabolic Panel; Future  -     Case Request    Other orders  -     Follow Anesthesia Guidelines / Protocol; Future  -     Follow Anesthesia Guidelines / Protocol; Standing  -     Verify / Perform Chlorhexidine Skin Prep; Standing  -     Verify / Perform Chlorhexidine Skin Prep if Indicated (If Not Already Completed); Standing  -     Obtain Informed Consent; Future  -     Provide NPO Instructions to Patient; Future  -     Chlorhexidine Skin Prep; Future  -     Notify physician (specify); Standing  -     Instructions on coughing, deep breathing, and incentive spirometry.; Standing  -     Oxygen Therapy-; Standing         Vanessa Dunaway is a 53 y.o. female  with biliary colic 2/2 cholelithiasis. History and imaging above are consistent with this diagnosis. I did  the patient that there is a small chance that cholecystectomy does not completely resolve the pain, and that if this is the case, we will refer the patient to GI for an EGD. However, the symptoms including their nature, timing and duration are most consistent with biliary colic. I reviewed the gallbladder anatomy with the patient, and after a thorough discussion of risks (including bleeding, infection, bile leak, damage to surrounding structures including the common bile duct, and risks of anesthesia) and benefits, the patient wishes to proceed with laparoscopic robotic assisted cholecystectomy, possible cholangiogram. [[I will perform a trucut liver biopsy at the time of surgery due to his history of fatty liver disease.]] The patient has an appointment for pre-op work up including EKG, CXR, CMP and CBC. The patient is currently scheduled for laparoscopic robotic assisted cholecystectomy, with ICG guided cholangiogram possible fluoroscopy on 12/4/23.    This is a new problem. I have reviewed the US and note above. I have ordered CBC, CMP, CXR and EKG. She is booked for major surgery.     I also discussed with the patient post operative pain management including multimodal pain control utilizing Tylenol, ibuprofen, and Roxicodone for breakthrough pain. I will plan to give the patient 10 tabs of 5mg Roxicodone post operatively for breakthrough pain    BMI is within normal parameters. No other follow-up for BMI required.      Heather Lange MD  11/20/23  10:47 CST

## 2023-11-29 ENCOUNTER — HOSPITAL ENCOUNTER (OUTPATIENT)
Dept: GENERAL RADIOLOGY | Facility: HOSPITAL | Age: 53
Discharge: HOME | End: 2023-11-29
Payer: COMMERCIAL

## 2023-11-29 ENCOUNTER — PRE-ADMISSION TESTING (OUTPATIENT)
Dept: PREADMISSION TESTING | Facility: HOSPITAL | Age: 53
End: 2023-11-29
Payer: COMMERCIAL

## 2023-11-29 VITALS
WEIGHT: 144.18 LBS | DIASTOLIC BLOOD PRESSURE: 100 MMHG | BODY MASS INDEX: 24.02 KG/M2 | RESPIRATION RATE: 16 BRPM | HEIGHT: 65 IN | OXYGEN SATURATION: 99 % | SYSTOLIC BLOOD PRESSURE: 136 MMHG | HEART RATE: 81 BPM

## 2023-11-29 DIAGNOSIS — K80.50 BILIARY COLIC: ICD-10-CM

## 2023-11-29 LAB
ALBUMIN SERPL-MCNC: 4.9 G/DL (ref 3.5–5.2)
ALBUMIN/GLOB SERPL: 1.8 G/DL
ALP SERPL-CCNC: 110 U/L (ref 39–117)
ALT SERPL W P-5'-P-CCNC: 32 U/L (ref 1–33)
ANION GAP SERPL CALCULATED.3IONS-SCNC: 12 MMOL/L (ref 5–15)
AST SERPL-CCNC: 23 U/L (ref 1–32)
BASOPHILS # BLD AUTO: 0.04 10*3/MM3 (ref 0–0.2)
BASOPHILS NFR BLD AUTO: 0.9 % (ref 0–1.5)
BILIRUB SERPL-MCNC: 0.5 MG/DL (ref 0–1.2)
BUN SERPL-MCNC: 14 MG/DL (ref 6–20)
BUN/CREAT SERPL: 25.5 (ref 7–25)
CALCIUM SPEC-SCNC: 10 MG/DL (ref 8.6–10.5)
CHLORIDE SERPL-SCNC: 102 MMOL/L (ref 98–107)
CO2 SERPL-SCNC: 28 MMOL/L (ref 22–29)
CREAT SERPL-MCNC: 0.55 MG/DL (ref 0.57–1)
DEPRECATED RDW RBC AUTO: 41 FL (ref 37–54)
EGFRCR SERPLBLD CKD-EPI 2021: 109.1 ML/MIN/1.73
EOSINOPHIL # BLD AUTO: 0.06 10*3/MM3 (ref 0–0.4)
EOSINOPHIL NFR BLD AUTO: 1.3 % (ref 0.3–6.2)
ERYTHROCYTE [DISTWIDTH] IN BLOOD BY AUTOMATED COUNT: 12.4 % (ref 12.3–15.4)
GLOBULIN UR ELPH-MCNC: 2.7 GM/DL
GLUCOSE SERPL-MCNC: 90 MG/DL (ref 65–99)
HCT VFR BLD AUTO: 43.2 % (ref 34–46.6)
HGB BLD-MCNC: 14 G/DL (ref 12–15.9)
IMM GRANULOCYTES # BLD AUTO: 0.01 10*3/MM3 (ref 0–0.05)
IMM GRANULOCYTES NFR BLD AUTO: 0.2 % (ref 0–0.5)
LYMPHOCYTES # BLD AUTO: 1.46 10*3/MM3 (ref 0.7–3.1)
LYMPHOCYTES NFR BLD AUTO: 31.6 % (ref 19.6–45.3)
MCH RBC QN AUTO: 29.4 PG (ref 26.6–33)
MCHC RBC AUTO-ENTMCNC: 32.4 G/DL (ref 31.5–35.7)
MCV RBC AUTO: 90.8 FL (ref 79–97)
MONOCYTES # BLD AUTO: 0.36 10*3/MM3 (ref 0.1–0.9)
MONOCYTES NFR BLD AUTO: 7.8 % (ref 5–12)
NEUTROPHILS NFR BLD AUTO: 2.69 10*3/MM3 (ref 1.7–7)
NEUTROPHILS NFR BLD AUTO: 58.2 % (ref 42.7–76)
NRBC BLD AUTO-RTO: 0 /100 WBC (ref 0–0.2)
PLATELET # BLD AUTO: 280 10*3/MM3 (ref 140–450)
PMV BLD AUTO: 9.4 FL (ref 6–12)
POTASSIUM SERPL-SCNC: 3.9 MMOL/L (ref 3.5–5.2)
PROT SERPL-MCNC: 7.6 G/DL (ref 6–8.5)
RBC # BLD AUTO: 4.76 10*6/MM3 (ref 3.77–5.28)
SODIUM SERPL-SCNC: 142 MMOL/L (ref 136–145)
WBC NRBC COR # BLD AUTO: 4.62 10*3/MM3 (ref 3.4–10.8)

## 2023-11-29 PROCEDURE — 93005 ELECTROCARDIOGRAM TRACING: CPT

## 2023-11-29 PROCEDURE — 36415 COLL VENOUS BLD VENIPUNCTURE: CPT

## 2023-11-29 PROCEDURE — 85025 COMPLETE CBC W/AUTO DIFF WBC: CPT

## 2023-11-29 PROCEDURE — 71045 X-RAY EXAM CHEST 1 VIEW: CPT

## 2023-11-29 PROCEDURE — 80053 COMPREHEN METABOLIC PANEL: CPT

## 2023-11-29 NOTE — DISCHARGE INSTRUCTIONS
Before you come to the hospital        Arrival time: AS DIRECTED BY OFFICE     YOU MAY TAKE THE FOLLOWING MEDICATION(S) THE MORNING OF SURGERY WITH A SIP OF WATER: PRILOSEC, XANAX           ALL OTHER HOME MEDICATION CHECK WITH YOUR PHYSICIAN (especially if   you are taking diabetes medicines or blood thinners)    Do not take any Erectile Dysfunction medications (EX: CIALIS, VIAGRA) 24 hours prior to surgery.      If you were given and instructed to use a germ- killing soap, use as directed the night before surgery and again the morning of surgery or as directed by your surgeon. (Use one-half of the bottle with each shower.)   See attached information for How to Use Chlorhexidine for Bathing if applicable.            Eating and drinking restrictions prior to scheduled arrival time    2 Hours before arrival time STOP   Drinking Clear liquids (water, black coffee-NO CREAM,  apple juice-no pulp)    Clear Liquids    Water and flavored water                                                                      Clear Fruit juices, such as cranberry juice and apple juice.  Black coffee (NO cream of any kind, including powdered).  Plain tea  Clear bouillon or broth.  Flavored gelatin.  Soda.  Gatorade or Powerade.    8 Hours before arrival time STOP   All food, full liquids, and dairy products  Full liquid examples  Juices that have pulp.  Frozen ice pops that contain fruit pieces.  Coffee with creamer  Milk.  Yogurt.    (It is extremely important that you follow these guidelines to prevent delay or cancelation of your procedure)                       MANAGING PAIN AFTER SURGERY    We know you are probably wondering what your pain will be like after surgery.  Following surgery it is unrealistic to expect you will not have pain.   Pain is how our bodies let us know that something is wrong or cautions us to be careful.  That said, our goal is to make your pain tolerable.    Methods we may use to treat your pain include (oral or  IV medications, PCAs, epidurals, nerve blocks, etc.)   While some procedures require IV pain medications for a short time after surgery, transitioning to pain medications by mouth allows for better management of pain.   Your nurse will encourage you to take oral pain medications whenever possible.  IV medications work almost immediately, but only last a short while.  Taking medications by mouth allows for a more constant level of medication in your blood stream for a longer period of time.      Once your pain is out of control it is harder to get back under control.  It is important you are aware when your next dose of pain medication is due.  If you are admitted, your nurse may write the time of your next dose on the white board in your room to help you remember.      We are interested in your pain and encourage you to inform us about aggravating factors during your visit.   Many times a simple repositioning every few hours can make a big difference.    If your physician says it is okay, do not let your pain prevent you from getting out of bed. Be sure to call your nurse for assistance prior to getting up so you do not fall.      Before surgery, please decide your tolerable pain goal.  These faces help describe the pain ratings we use on a 0-10 scale.   Be prepared to tell us your goal and whether or not you take pain or anxiety medications at home.          Preparing for Surgery  Preparing for surgery is an important part of your care. It can make things go more smoothly and help you avoid complications. The steps leading up to surgery may vary among hospitals. Follow all instructions given to you by your health care providers. Ask questions if you do not understand something. Talk about any concerns that you have.  Here are some questions to consider asking before your surgery:  If my surgery is not an emergency (is elective), when would be the best time to have the surgery?  What arrangements do I need to make  for work, home, or school?  What will my recovery be like? How long will it be before I can return to normal activities?  Will I need to prepare my home? Will I need to arrange care for me or my children?  Should I expect to have pain after surgery? What are my pain management options? Are there nonmedical options that I can try for pain?  Tell a health care provider about:  Any allergies you have.  All medicines you are taking, including vitamins, herbs, eye drops, creams, and over-the-counter medicines.  Any problems you or family members have had with anesthetic medicines.  Any blood disorders you have.  Any surgeries you have had.  Any medical conditions you have.  Whether you are pregnant or may be pregnant.  What are the risks?  The risks and complications of surgery depend on the specific procedure that you have. Discuss all the risks with your health care providers before your surgery. Ask about common surgical complications, which may include:  Infection.  Bleeding or a need for blood replacement (transfusion).  Allergic reactions to medicines.  Damage to surrounding nerves, tissues, or structures.  A blood clot.  Scarring.  Failure of the surgery to correct the problem.  Follow these instructions before the procedure:  Several days or weeks before your procedure  You may have a physical exam by your primary health care provider to make sure it is safe for you to have surgery.  You may have testing. This may include a chest X-ray, blood and urine tests, electrocardiogram (ECG), or other testing.  Ask your health care provider about:  Changing or stopping your regular medicines. This is especially important if you are taking diabetes medicines or blood thinners.  Taking medicines such as aspirin and ibuprofen. These medicines can thin your blood. Do not take these medicines unless your health care provider tells you to take them.  Taking over-the-counter medicines, vitamins, herbs, and supplements.  Do not  use any products that contain nicotine or tobacco, such as cigarettes and e-cigarettes. If you need help quitting, ask your health care provider.  Avoid alcohol.  Ask your health care provider if there are exercises you can do to prepare for surgery.  Eat a healthy diet.   Plan to have someone 18 years of age or older to take you home from the hospital. We will need to verify your ride on the morning of surgery if you are being discharged home on the same day. Tell your ride to be expecting a call from the hospital prior to your procedure.   Plan to have a responsible adult care for you for at least 24 hours after you leave the hospital or clinic. This is important.  The day before your procedure  You may be given antibiotic medicine to take by mouth to help prevent infection. Take it as told by your health care provider.  You may be asked to shower with a germ-killing soap.  Follow instructions from your health care provider about eating and drinking restrictions. This includes gum, mints and hard candy.  Pack comfortable clothes according to your procedure.   The day of your procedure  You may need to take another shower with a germ-killing soap before you leave home in the morning.  With a small sip of water, take only the medicines that you are told to take.  Remove all jewelry including rings.   Leave anything you consider valuable at home except hearing aids if needed.  You do not need to bring your home medications into the hospital.   Do not wear any makeup, nail polish, powder, deodorant, lotion, hair accessories, or anything on your skin or body except your clothes.  If you will be staying in the hospital, bring a case to hold your glasses, contacts, or dentures. You may also want to bring your robe and non-skid footwear.       (Do not use denture adhesives since you will be asked to remove them during  surgery).   If you wear oxygen at home, bring it with you the day of surgery.  If instructed by your  health care provider, bring your sleep apnea device with you on the day of your surgery (if this applies to you).  You may want to leave your suitcase and sleep apnea device in the car until after surgery.   Arrive at the hospital as scheduled.  Bring a friend or family member with you who can help to answer questions and be present while you meet with your health care provider.  At the hospital  When you arrive at the hospital:  Go to registration located at the main entrance of the hospital. You will be registered and given a beeper and a sticker sheet. Take the stickers to the Outpatient nurses desk and place in the black tray. This is to notify staff that you have arrived. Then return to the lobby to wait.   When your beeper lights up and vibrates proceed through the double doors, under the stairs, and a member of the Outpatient Surgery staff will escort you to your preoperative room.  You may have to wear compression sleeves. These help to prevent blood clots and reduce swelling in your legs.  An IV may be inserted into one of your veins.              In the operating room, you may be given one or more of the following:        A medicine to help you relax (sedative).        A medicine to numb the area (local anesthetic).        A medicine to make you fall asleep (general anesthetic).        A medicine that is injected into an area of your body to numb everything below the                      injection site (regional anesthetic).  You may be given an antibiotic through your IV to help prevent infection.  Your surgical site will be marked or identified.    Contact a health care provider if you:  Develop a fever of more than 100.4°F (38°C) or other feelings of illness during the 48 hours before your surgery.  Have symptoms that get worse.  Have questions or concerns about your surgery.  Summary  Preparing for surgery can make the procedure go more smoothly and lower your risk of complications.  Before surgery,  make a list of questions and concerns to discuss with your surgeon. Ask about the risks and possible complications.  In the days or weeks before your surgery, follow all instructions from your health care provider. You may need to stop smoking, avoid alcohol, follow eating restrictions, and change or stop your regular medicines.  Contact your surgeon if you develop a fever or other signs of illness during the few days before your surgery.  This information is not intended to replace advice given to you by your health care provider. Make sure you discuss any questions you have with your health care provider.  Document Revised: 12/21/2018 Document Reviewed: 10/23/2018  [employer] Patient Education © 2021 [employer]         How to Use Chlorhexidine Before Surgery  Chlorhexidine gluconate (CHG) is a germ-killing (antiseptic) solution that is used to clean the skin. It can get rid of the bacteria that normally live on the skin and can keep them away for about 24 hours. To clean your skin with CHG, you may be given:  A CHG solution to use in the shower or as part of a sponge bath.  A prepackaged cloth that contains CHG.  Cleaning your skin with CHG may help lower the risk for infection:  While you are staying in the intensive care unit of the hospital.  If you have a vascular access, such as a central line, to provide short-term or long-term access to your veins.  If you have a catheter to drain urine from your bladder.  If you are on a ventilator. A ventilator is a machine that helps you breathe by moving air in and out of your lungs.  After surgery.  What are the risks?  Risks of using CHG include:  A skin reaction.  Hearing loss, if CHG gets in your ears and you have a perforated eardrum.  Eye injury, if CHG gets in your eyes and is not rinsed out.  The CHG product catching fire.  Make sure that you avoid smoking and flames after applying CHG to your skin.  Do not use CHG:  If you have a chlorhexidine allergy or have  previously reacted to chlorhexidine.  On babies younger than 2 months of age.  How to use CHG solution  Use CHG only as told by your health care provider, and follow the instructions on the label.  Use the full amount of CHG as directed. Usually, this is one bottle.  During a shower    Follow these steps when using CHG solution during a shower (unless your health care provider gives you different instructions):  Start the shower.  Use your normal soap and shampoo to wash your face and hair.  Turn off the shower or move out of the shower stream.  Pour the CHG onto a clean washcloth. Do not use any type of brush or rough-edged sponge.  Starting at your neck, lather your body down to your toes. Make sure you follow these instructions:  If you will be having surgery, pay special attention to the part of your body where you will be having surgery. Scrub this area for at least 1 minute.  Do not use CHG on your head or face. If the solution gets into your ears or eyes, rinse them well with water.  Avoid your genital area.  Avoid any areas of skin that have broken skin, cuts, or scrapes.  Scrub your back and under your arms. Make sure to wash skin folds.  Let the lather sit on your skin for 1-2 minutes or as long as told by your health care provider.  Thoroughly rinse your entire body in the shower. Make sure that all body creases and crevices are rinsed well.  Dry off with a clean towel. Do not put any substances on your body afterward--such as powder, lotion, or perfume--unless you are told to do so by your health care provider. Only use lotions that are recommended by the .  Put on clean clothes or pajamas.  If it is the night before your surgery, sleep in clean sheets.     During a sponge bath  Follow these steps when using CHG solution during a sponge bath (unless your health care provider gives you different instructions):  Use your normal soap and shampoo to wash your face and hair.  Pour the CHG onto a  clean washcloth.  Starting at your neck, lather your body down to your toes. Make sure you follow these instructions:  If you will be having surgery, pay special attention to the part of your body where you will be having surgery. Scrub this area for at least 1 minute.  Do not use CHG on your head or face. If the solution gets into your ears or eyes, rinse them well with water.  Avoid your genital area.  Avoid any areas of skin that have broken skin, cuts, or scrapes.  Scrub your back and under your arms. Make sure to wash skin folds.  Let the lather sit on your skin for 1-2 minutes or as long as told by your health care provider.  Using a different clean, wet washcloth, thoroughly rinse your entire body. Make sure that all body creases and crevices are rinsed well.  Dry off with a clean towel. Do not put any substances on your body afterward--such as powder, lotion, or perfume--unless you are told to do so by your health care provider. Only use lotions that are recommended by the .  Put on clean clothes or pajamas.  If it is the night before your surgery, sleep in clean sheets.  How to use CHG prepackaged cloths  Only use CHG cloths as told by your health care provider, and follow the instructions on the label.  Use the CHG cloth on clean, dry skin.  Do not use the CHG cloth on your head or face unless your health care provider tells you to.  When washing with the CHG cloth:  Avoid your genital area.  Avoid any areas of skin that have broken skin, cuts, or scrapes.  Before surgery    Follow these steps when using a CHG cloth to clean before surgery (unless your health care provider gives you different instructions):  Using the CHG cloth, vigorously scrub the part of your body where you will be having surgery. Scrub using a back-and-forth motion for 3 minutes. The area on your body should be completely wet with CHG when you are done scrubbing.  Do not rinse. Discard the cloth and let the area air-dry. Do  not put any substances on the area afterward, such as powder, lotion, or perfume.  Put on clean clothes or pajamas.  If it is the night before your surgery, sleep in clean sheets.     For general bathing  Follow these steps when using CHG cloths for general bathing (unless your health care provider gives you different instructions).  Use a separate CHG cloth for each area of your body. Make sure you wash between any folds of skin and between your fingers and toes. Wash your body in the following order, switching to a new cloth after each step:  The front of your neck, shoulders, and chest.  Both of your arms, under your arms, and your hands.  Your stomach and groin area, avoiding the genitals.  Your right leg and foot.  Your left leg and foot.  The back of your neck, your back, and your buttocks.  Do not rinse. Discard the cloth and let the area air-dry. Do not put any substances on your body afterward--such as powder, lotion, or perfume--unless you are told to do so by your health care provider. Only use lotions that are recommended by the .  Put on clean clothes or pajamas.  Contact a health care provider if:  Your skin gets irritated after scrubbing.  You have questions about using your solution or cloth.  You swallow any chlorhexidine. Call your local poison control center (1-568.698.1499 in the [place]  Get help right away if:  Your eyes itch badly, or they become very red or swollen.  Your skin itches badly and is red or swollen.  Your hearing changes.  You have trouble seeing.  You have swelling or tingling in your mouth or throat.  You have trouble breathing.  These symptoms may represent a serious problem that is an emergency. Do not wait to see if the symptoms will go away. Get medical help right away. Call your local emergency services (156 in the [place] Do not drive yourself to the hospital.  Summary  Chlorhexidine gluconate (CHG) is a germ-killing (antiseptic) solution that is used to clean  the skin. Cleaning your skin with CHG may help to lower your risk for infection.  You may be given CHG to use for bathing. It may be in a bottle or in a prepackaged cloth to use on your skin. Carefully follow your health care provider's instructions and the instructions on the product label.  Do not use CHG if you have a chlorhexidine allergy.  Contact your health care provider if your skin gets irritated after scrubbing.  This information is not intended to replace advice given to you by your health care provider. Make sure you discuss any questions you have with your health care provider.  Document Revised: 04/17/2023 Document Reviewed: 02/28/2022  [employer] Patient Education © 2023 [employer]

## 2023-12-04 ENCOUNTER — HOSPITAL ENCOUNTER (OUTPATIENT)
Facility: HOSPITAL | Age: 53
Setting detail: HOSPITAL OUTPATIENT SURGERY
Discharge: HOME | End: 2023-12-04
Attending: STUDENT IN AN ORGANIZED HEALTH CARE EDUCATION/TRAINING PROGRAM | Admitting: STUDENT IN AN ORGANIZED HEALTH CARE EDUCATION/TRAINING PROGRAM
Payer: COMMERCIAL

## 2023-12-04 ENCOUNTER — ANESTHESIA (OUTPATIENT)
Dept: PERIOP | Facility: HOSPITAL | Age: 53
End: 2023-12-04
Payer: COMMERCIAL

## 2023-12-04 ENCOUNTER — ANESTHESIA EVENT (OUTPATIENT)
Dept: PERIOP | Facility: HOSPITAL | Age: 53
End: 2023-12-04
Payer: COMMERCIAL

## 2023-12-04 VITALS
OXYGEN SATURATION: 100 % | DIASTOLIC BLOOD PRESSURE: 82 MMHG | HEART RATE: 62 BPM | SYSTOLIC BLOOD PRESSURE: 139 MMHG | RESPIRATION RATE: 20 BRPM | TEMPERATURE: 97 F

## 2023-12-04 DIAGNOSIS — K80.50 BILIARY COLIC: ICD-10-CM

## 2023-12-04 LAB
QT INTERVAL: 400 MS
QTC INTERVAL: 406 MS

## 2023-12-04 PROCEDURE — 25010000002 CEFAZOLIN PER 500 MG: Performed by: NURSE ANESTHETIST, CERTIFIED REGISTERED

## 2023-12-04 PROCEDURE — 47563 LAPARO CHOLECYSTECTOMY/GRAPH: CPT | Performed by: STUDENT IN AN ORGANIZED HEALTH CARE EDUCATION/TRAINING PROGRAM

## 2023-12-04 PROCEDURE — 25010000002 MIDAZOLAM PER 1 MG: Performed by: ANESTHESIOLOGY

## 2023-12-04 PROCEDURE — 25010000002 FENTANYL CITRATE (PF) 100 MCG/2ML SOLUTION: Performed by: NURSE ANESTHETIST, CERTIFIED REGISTERED

## 2023-12-04 PROCEDURE — 25010000002 HEPARIN (PORCINE) PER 1000 UNITS: Performed by: STUDENT IN AN ORGANIZED HEALTH CARE EDUCATION/TRAINING PROGRAM

## 2023-12-04 PROCEDURE — 25010000002 DEXAMETHASONE PER 1 MG: Performed by: STUDENT IN AN ORGANIZED HEALTH CARE EDUCATION/TRAINING PROGRAM

## 2023-12-04 PROCEDURE — 25010000002 PROPOFOL 10 MG/ML EMULSION: Performed by: NURSE ANESTHETIST, CERTIFIED REGISTERED

## 2023-12-04 PROCEDURE — 25810000003 LACTATED RINGERS PER 1000 ML: Performed by: STUDENT IN AN ORGANIZED HEALTH CARE EDUCATION/TRAINING PROGRAM

## 2023-12-04 PROCEDURE — 25010000002 DEXAMETHASONE PER 1 MG: Performed by: NURSE ANESTHETIST, CERTIFIED REGISTERED

## 2023-12-04 PROCEDURE — 25010000002 DROPERIDOL PER 5 MG: Performed by: ANESTHESIOLOGY

## 2023-12-04 PROCEDURE — 25010000002 ONDANSETRON PER 1 MG: Performed by: NURSE ANESTHETIST, CERTIFIED REGISTERED

## 2023-12-04 PROCEDURE — 25010000002 SUGAMMADEX 200 MG/2ML SOLUTION: Performed by: NURSE ANESTHETIST, CERTIFIED REGISTERED

## 2023-12-04 PROCEDURE — 25010000002 DEXAMETHASONE PER 1 MG: Performed by: ANESTHESIOLOGY

## 2023-12-04 PROCEDURE — 25810000003 LACTATED RINGERS PER 1000 ML: Performed by: ANESTHESIOLOGY

## 2023-12-04 PROCEDURE — 25010000002 BUPIVACAINE 0.5 % SOLUTION 50 ML VIAL: Performed by: STUDENT IN AN ORGANIZED HEALTH CARE EDUCATION/TRAINING PROGRAM

## 2023-12-04 PROCEDURE — 25010000002 MORPHINE SULFATE (PF) 2 MG/ML SOLUTION 1 ML CARTRIDGE: Performed by: STUDENT IN AN ORGANIZED HEALTH CARE EDUCATION/TRAINING PROGRAM

## 2023-12-04 PROCEDURE — 25810000003 SODIUM CHLORIDE PER 500 ML: Performed by: STUDENT IN AN ORGANIZED HEALTH CARE EDUCATION/TRAINING PROGRAM

## 2023-12-04 PROCEDURE — 88304 TISSUE EXAM BY PATHOLOGIST: CPT | Performed by: STUDENT IN AN ORGANIZED HEALTH CARE EDUCATION/TRAINING PROGRAM

## 2023-12-04 PROCEDURE — 25010000002 BUPIVACAINE (PF) 0.25 % SOLUTION 30 ML VIAL: Performed by: STUDENT IN AN ORGANIZED HEALTH CARE EDUCATION/TRAINING PROGRAM

## 2023-12-04 PROCEDURE — 25010000002 LIDOCAINE 1 % SOLUTION 20 ML VIAL: Performed by: STUDENT IN AN ORGANIZED HEALTH CARE EDUCATION/TRAINING PROGRAM

## 2023-12-04 DEVICE — LARGE LIGATION CLIPS 6 CLIPS/CART
Type: IMPLANTABLE DEVICE | Site: ABDOMEN | Status: FUNCTIONAL
Brand: VAS-Q-CLIP

## 2023-12-04 RX ORDER — ACETAMINOPHEN 500 MG
1000 TABLET ORAL ONCE
Status: COMPLETED | OUTPATIENT
Start: 2023-12-04 | End: 2023-12-04

## 2023-12-04 RX ORDER — DEXAMETHASONE SODIUM PHOSPHATE 4 MG/ML
4 INJECTION, SOLUTION INTRA-ARTICULAR; INTRALESIONAL; INTRAMUSCULAR; INTRAVENOUS; SOFT TISSUE ONCE AS NEEDED
Status: COMPLETED | OUTPATIENT
Start: 2023-12-04 | End: 2023-12-04

## 2023-12-04 RX ORDER — CEFAZOLIN SODIUM 1 G/3ML
INJECTION, POWDER, FOR SOLUTION INTRAMUSCULAR; INTRAVENOUS AS NEEDED
Status: DISCONTINUED | OUTPATIENT
Start: 2023-12-04 | End: 2023-12-04 | Stop reason: SURG

## 2023-12-04 RX ORDER — IBUPROFEN 200 MG
600 TABLET ORAL EVERY 8 HOURS
Start: 2023-12-04 | End: 2024-12-03

## 2023-12-04 RX ORDER — SODIUM CHLORIDE 0.9 % (FLUSH) 0.9 %
3-10 SYRINGE (ML) INJECTION AS NEEDED
Status: DISCONTINUED | OUTPATIENT
Start: 2023-12-04 | End: 2023-12-04 | Stop reason: HOSPADM

## 2023-12-04 RX ORDER — ONDANSETRON 4 MG/1
4 TABLET, FILM COATED ORAL EVERY 8 HOURS PRN
Qty: 15 TABLET | Refills: 0 | Status: SHIPPED | OUTPATIENT
Start: 2023-12-04 | End: 2024-12-03

## 2023-12-04 RX ORDER — LIDOCAINE HYDROCHLORIDE 10 MG/ML
0.5 INJECTION, SOLUTION EPIDURAL; INFILTRATION; INTRACAUDAL; PERINEURAL ONCE AS NEEDED
Status: DISCONTINUED | OUTPATIENT
Start: 2023-12-04 | End: 2023-12-04 | Stop reason: HOSPADM

## 2023-12-04 RX ORDER — HYDROCODONE BITARTRATE AND ACETAMINOPHEN 5; 325 MG/1; MG/1
1 TABLET ORAL ONCE AS NEEDED
Status: COMPLETED | OUTPATIENT
Start: 2023-12-04 | End: 2023-12-04

## 2023-12-04 RX ORDER — DEXAMETHASONE SODIUM PHOSPHATE 4 MG/ML
INJECTION, SOLUTION INTRA-ARTICULAR; INTRALESIONAL; INTRAMUSCULAR; INTRAVENOUS; SOFT TISSUE AS NEEDED
Status: DISCONTINUED | OUTPATIENT
Start: 2023-12-04 | End: 2023-12-04 | Stop reason: SURG

## 2023-12-04 RX ORDER — OXYCODONE HYDROCHLORIDE 5 MG/1
5 TABLET ORAL EVERY 8 HOURS PRN
Qty: 10 TABLET | Refills: 0 | Status: SHIPPED | OUTPATIENT
Start: 2023-12-04 | End: 2024-12-03

## 2023-12-04 RX ORDER — FLUMAZENIL 0.1 MG/ML
0.2 INJECTION INTRAVENOUS AS NEEDED
Status: DISCONTINUED | OUTPATIENT
Start: 2023-12-04 | End: 2023-12-04 | Stop reason: HOSPADM

## 2023-12-04 RX ORDER — SODIUM CHLORIDE 9 MG/ML
40 INJECTION, SOLUTION INTRAVENOUS AS NEEDED
Status: DISCONTINUED | OUTPATIENT
Start: 2023-12-04 | End: 2023-12-04 | Stop reason: HOSPADM

## 2023-12-04 RX ORDER — ONDANSETRON 2 MG/ML
INJECTION INTRAMUSCULAR; INTRAVENOUS AS NEEDED
Status: DISCONTINUED | OUTPATIENT
Start: 2023-12-04 | End: 2023-12-04 | Stop reason: SURG

## 2023-12-04 RX ORDER — LABETALOL HYDROCHLORIDE 5 MG/ML
5 INJECTION, SOLUTION INTRAVENOUS
Status: DISCONTINUED | OUTPATIENT
Start: 2023-12-04 | End: 2023-12-04 | Stop reason: HOSPADM

## 2023-12-04 RX ORDER — ROCURONIUM BROMIDE 10 MG/ML
INJECTION, SOLUTION INTRAVENOUS AS NEEDED
Status: DISCONTINUED | OUTPATIENT
Start: 2023-12-04 | End: 2023-12-04 | Stop reason: SURG

## 2023-12-04 RX ORDER — MIDAZOLAM HYDROCHLORIDE 1 MG/ML
2 INJECTION INTRAMUSCULAR; INTRAVENOUS
Status: DISCONTINUED | OUTPATIENT
Start: 2023-12-04 | End: 2023-12-04 | Stop reason: HOSPADM

## 2023-12-04 RX ORDER — SODIUM CHLORIDE 0.9 % (FLUSH) 0.9 %
3 SYRINGE (ML) INJECTION AS NEEDED
Status: DISCONTINUED | OUTPATIENT
Start: 2023-12-04 | End: 2023-12-04 | Stop reason: HOSPADM

## 2023-12-04 RX ORDER — HEPARIN SODIUM 5000 [USP'U]/ML
5000 INJECTION, SOLUTION INTRAVENOUS; SUBCUTANEOUS ONCE
Status: COMPLETED | OUTPATIENT
Start: 2023-12-04 | End: 2023-12-04

## 2023-12-04 RX ORDER — NALOXONE HCL 0.4 MG/ML
0.4 VIAL (ML) INJECTION AS NEEDED
Status: DISCONTINUED | OUTPATIENT
Start: 2023-12-04 | End: 2023-12-04 | Stop reason: HOSPADM

## 2023-12-04 RX ORDER — ACETAMINOPHEN 325 MG/1
975 TABLET ORAL EVERY 8 HOURS
Start: 2023-12-04 | End: 2024-12-03

## 2023-12-04 RX ORDER — DROPERIDOL 2.5 MG/ML
0.62 INJECTION, SOLUTION INTRAMUSCULAR; INTRAVENOUS ONCE AS NEEDED
Status: COMPLETED | OUTPATIENT
Start: 2023-12-04 | End: 2023-12-04

## 2023-12-04 RX ORDER — LIDOCAINE HYDROCHLORIDE 20 MG/ML
INJECTION, SOLUTION EPIDURAL; INFILTRATION; INTRACAUDAL; PERINEURAL AS NEEDED
Status: DISCONTINUED | OUTPATIENT
Start: 2023-12-04 | End: 2023-12-04 | Stop reason: SURG

## 2023-12-04 RX ORDER — SODIUM CHLORIDE, SODIUM LACTATE, POTASSIUM CHLORIDE, CALCIUM CHLORIDE 600; 310; 30; 20 MG/100ML; MG/100ML; MG/100ML; MG/100ML
100 INJECTION, SOLUTION INTRAVENOUS CONTINUOUS
Status: DISCONTINUED | OUTPATIENT
Start: 2023-12-04 | End: 2023-12-04 | Stop reason: HOSPADM

## 2023-12-04 RX ORDER — IBUPROFEN 600 MG/1
600 TABLET ORAL ONCE AS NEEDED
Status: COMPLETED | OUTPATIENT
Start: 2023-12-04 | End: 2023-12-04

## 2023-12-04 RX ORDER — SODIUM CHLORIDE 0.9 % (FLUSH) 0.9 %
3 SYRINGE (ML) INJECTION EVERY 12 HOURS SCHEDULED
Status: DISCONTINUED | OUTPATIENT
Start: 2023-12-04 | End: 2023-12-04 | Stop reason: HOSPADM

## 2023-12-04 RX ORDER — FENTANYL CITRATE 50 UG/ML
25 INJECTION, SOLUTION INTRAMUSCULAR; INTRAVENOUS
Status: DISCONTINUED | OUTPATIENT
Start: 2023-12-04 | End: 2023-12-04 | Stop reason: HOSPADM

## 2023-12-04 RX ORDER — PROPOFOL 10 MG/ML
VIAL (ML) INTRAVENOUS AS NEEDED
Status: DISCONTINUED | OUTPATIENT
Start: 2023-12-04 | End: 2023-12-04 | Stop reason: SURG

## 2023-12-04 RX ORDER — HYDROCODONE BITARTRATE AND ACETAMINOPHEN 10; 325 MG/1; MG/1
1 TABLET ORAL EVERY 4 HOURS PRN
Status: DISCONTINUED | OUTPATIENT
Start: 2023-12-04 | End: 2023-12-04 | Stop reason: HOSPADM

## 2023-12-04 RX ORDER — ONDANSETRON 2 MG/ML
4 INJECTION INTRAMUSCULAR; INTRAVENOUS ONCE AS NEEDED
Status: DISCONTINUED | OUTPATIENT
Start: 2023-12-04 | End: 2023-12-04 | Stop reason: HOSPADM

## 2023-12-04 RX ORDER — SODIUM CHLORIDE, SODIUM LACTATE, POTASSIUM CHLORIDE, CALCIUM CHLORIDE 600; 310; 30; 20 MG/100ML; MG/100ML; MG/100ML; MG/100ML
1000 INJECTION, SOLUTION INTRAVENOUS CONTINUOUS
Status: DISCONTINUED | OUTPATIENT
Start: 2023-12-04 | End: 2023-12-04 | Stop reason: HOSPADM

## 2023-12-04 RX ORDER — SODIUM CHLORIDE 9 MG/ML
INJECTION, SOLUTION INTRAVENOUS AS NEEDED
Status: DISCONTINUED | OUTPATIENT
Start: 2023-12-04 | End: 2023-12-04 | Stop reason: HOSPADM

## 2023-12-04 RX ORDER — INDOCYANINE GREEN AND WATER 25 MG
3.75 KIT INJECTION ONCE
Status: DISCONTINUED | OUTPATIENT
Start: 2023-12-04 | End: 2023-12-04

## 2023-12-04 RX ORDER — FENTANYL CITRATE 50 UG/ML
INJECTION, SOLUTION INTRAMUSCULAR; INTRAVENOUS AS NEEDED
Status: DISCONTINUED | OUTPATIENT
Start: 2023-12-04 | End: 2023-12-04 | Stop reason: SURG

## 2023-12-04 RX ADMIN — IBUPROFEN 600 MG: 600 TABLET, FILM COATED ORAL at 17:43

## 2023-12-04 RX ADMIN — SODIUM CHLORIDE, POTASSIUM CHLORIDE, SODIUM LACTATE AND CALCIUM CHLORIDE 1000 ML: 600; 310; 30; 20 INJECTION, SOLUTION INTRAVENOUS at 14:10

## 2023-12-04 RX ADMIN — HEPARIN SODIUM 5000 UNITS: 5000 INJECTION INTRAVENOUS; SUBCUTANEOUS at 15:47

## 2023-12-04 RX ADMIN — PROPOFOL 170 MG: 10 INJECTION, EMULSION INTRAVENOUS at 16:21

## 2023-12-04 RX ADMIN — DEXAMETHASONE SODIUM PHOSPHATE 4 MG: 4 INJECTION INTRA-ARTICULAR; INTRALESIONAL; INTRAMUSCULAR; INTRAVENOUS; SOFT TISSUE at 15:56

## 2023-12-04 RX ADMIN — ACETAMINOPHEN 1000 MG: 500 TABLET ORAL at 15:55

## 2023-12-04 RX ADMIN — CEFAZOLIN 2 G: 330 INJECTION, POWDER, FOR SOLUTION INTRAMUSCULAR; INTRAVENOUS at 16:21

## 2023-12-04 RX ADMIN — DROPERIDOL 0.62 MG: 2.5 INJECTION, SOLUTION INTRAMUSCULAR; INTRAVENOUS at 17:25

## 2023-12-04 RX ADMIN — SODIUM CHLORIDE, POTASSIUM CHLORIDE, SODIUM LACTATE AND CALCIUM CHLORIDE 100 ML/HR: 600; 310; 30; 20 INJECTION, SOLUTION INTRAVENOUS at 17:46

## 2023-12-04 RX ADMIN — MIDAZOLAM HYDROCHLORIDE 2 MG: 1 INJECTION, SOLUTION INTRAMUSCULAR; INTRAVENOUS at 15:56

## 2023-12-04 RX ADMIN — SUGAMMADEX 200 MG: 100 INJECTION, SOLUTION INTRAVENOUS at 16:58

## 2023-12-04 RX ADMIN — FENTANYL CITRATE 100 MCG: 50 INJECTION, SOLUTION INTRAMUSCULAR; INTRAVENOUS at 16:21

## 2023-12-04 RX ADMIN — HYDROCODONE BITARTRATE AND ACETAMINOPHEN 1 TABLET: 5; 325 TABLET ORAL at 17:37

## 2023-12-04 RX ADMIN — DEXAMETHASONE SODIUM PHOSPHATE 4 MG: 4 INJECTION, SOLUTION INTRAMUSCULAR; INTRAVENOUS at 16:47

## 2023-12-04 RX ADMIN — ROCURONIUM BROMIDE 30 MG: 10 INJECTION, SOLUTION INTRAVENOUS at 16:21

## 2023-12-04 RX ADMIN — ONDANSETRON 4 MG: 2 INJECTION INTRAMUSCULAR; INTRAVENOUS at 16:47

## 2023-12-04 RX ADMIN — LIDOCAINE HYDROCHLORIDE 100 MG: 20 INJECTION, SOLUTION EPIDURAL; INFILTRATION; INTRACAUDAL; PERINEURAL at 16:21

## 2023-12-04 NOTE — ANESTHESIA PROCEDURE NOTES
Airway  Urgency: elective    Date/Time: 12/4/2023 4:22 PM  Airway not difficult    General Information and Staff    Patient location during procedure: OR  CRNA/CAA: Toan Roche CRNA    Indications and Patient Condition  Indications for airway management: airway protection    Preoxygenated: yes  Mask difficulty assessment: 1 - vent by mask    Final Airway Details  Final airway type: endotracheal airway      Successful airway: ETT  Cuffed: yes   Successful intubation technique: direct laryngoscopy  Endotracheal tube insertion site: oral  Blade: Jose  Blade size: 3  ETT size (mm): 7.5  Cormack-Lehane Classification: grade I - full view of glottis  Placement verified by: chest auscultation and capnometry   Cuff volume (mL): 6  Measured from: lips  ETT/EBT  to lips (cm): 20  Number of attempts at approach: 1  Assessment: lips, teeth, and gum same as pre-op and atraumatic intubation

## 2023-12-04 NOTE — OP NOTE
Laparoscopic Robotic Assisted Cholecystectomy with ICG guided Cholangiogram Operative Report:     Patient: Vanessa Dunaway MRN: [id number]    YOB: 1970  Age: 53 y.o.  Sex: female   Unit:  PAD OR Room/Bed: PAD OR/MAIN OR Location: [facility]    Admitting Physician: HEATHER REY    Primary Care Physician: Balaji Price MD             INDICATIONS: This is a 53 y.o. female who presents with biliary colic as indication for laparoscopic cholecystectomy. After a discussion of risks, benefits and alternatives, consent was obtained.     DATE OF OPERATION: 12/4/2023     Surgeon(s) and Role:     * Heather Rey MD - Primary    ANESTHESIA: General     PREOPERATIVE DIAGNOSIS: Biliary colic [K80.50]    POSTOPERATIVE DIAGNOSIS: Same    PROCEDURES PERFORMED:  Laparoscopic Robotic assisted Cholecystectomy with ICG guided cholangiogram without fluoroscopy     PROCEDURE DETAILS:   Preoperative ICG, antibiotics and DVT ppx were given. The patient was brought into the OR and placed in the supine position.  General anesthesia was induced.  The patient's abdomen was prepped and draped in the usual sterile fashion.  A time out was performed verifying the patient and the procedure. An 8 mm incision was made along the left subcostal margin and a Veress needle inserted.  The abdomen was inflated to 15 mmHg with CO2 gas. An 8 mm trocar was placed followed by the robotic camera.  A laparoscopic TAP block was performed with my deep local. Three additional 8 mm trocars were placed in an oblique line from left upper quadrant to right lower quadrant.  The patient was placed in slight reversed Trendelenburg position with right side up. The robot was docked. With the tip up, ProGrasp and hook, the head of the gallbladder was grasped and retracted over the dome of the liver.  The infundibulum was also grasped and retracted to the patient's right side, opening up the triangle of Calot. The hook was used  to open the fat overlying the neck of the gallbladder. The cystic artery and cystic duct were clearly visualized. The critical view of safety was obtained and the confluence of the common bile duct to the common hepatic and cystic ducts was visualized.  The Firefly view was turned on and the anatomy was confirmed with the pre-operatively injected ICG.  The cystic duct was double clipped proximally, single clipped distally, and divided. The cystic artery was single clipped proximally, single clipped distally and divided. The gallbladder was removed from the undersurface of the liver with electrocautery.  The gallbladder fossa demonstrated no signs of bleeding or leakage of bile. The robot was undocked.  The left subcostal trocar was removed and the Endo Catch bag was inserted through this incision. The gallbladder was placed in an Endo Catch bag and removed. The abdomen was desufflated and trocars removed. The extraction trocar site fascia was closed with an 0 Vicryl on a UR6.  The skin was closed with 4-0 Monocryl followed by skin glue.  The patient tolerated the procedure well, was extubated in the OR and transferred to the PACU in stable condition    Findings: distended gallbladder   Estimated Blood Loss:  25 mL   Complications: none apparent            Specimens: Gallbladder and contents     Disposition: PACU - hemodynamically stable.           Condition: stable    Heather Lange MD  12/04/23

## 2023-12-04 NOTE — INTERVAL H&P NOTE
H&P updated. The patient was examined and the following changes are noted:  No liver biopsy to be done. That was a typo in the H&P. No other changes.

## 2023-12-04 NOTE — DISCHARGE INSTRUCTIONS
Wound:   - you have skin glue on your incisions. Okay to shower tomorrow.   - Leave skin glue in place, it should slowly fall off over 2 weeks   - No swimming/soaking/bathing x 2 weeks to allow incisions to heal.     Activity:   - Activity as tolerated. NO heavy lifting x 4 weeks - no more than 25lb at a time.   - No driving or operating machinery on narcotic pain medication.     Pain medication:   - Take 1000mg of tylenol every 8 hours for 3 days. After three days, take it prn.   - Take 600mg of ibuprofen (motrin) every 8 hours for 3 days. After three days, take it prn.   - You have a prescription for a narcotic. It will be roxicodone 5mg tabs. Take these only as needed after you have taken the tylenol and ibuprofen. If you are taking the roxicodone, make sure to take a stool softener (colace) with it as it can cause constipation.   - The narcotic may make you nauseated, you will have a prescription for zofran in case of nausea.     Follow up:   - make an appointment to see me in 2 weeks  - If you have any concerns before then, call me office at [phone]

## 2023-12-04 NOTE — ANESTHESIA PREPROCEDURE EVALUATION
Anesthesia Evaluation     Patient summary reviewed   history of anesthetic complications:  prolonged sedation  NPO Solid Status: > 8 hours             Airway   Mallampati: I  TM distance: >3 FB  Neck ROM: full  Dental      Pulmonary    (-) asthma, sleep apnea, not a smoker  Cardiovascular - negative cardio ROS  Exercise tolerance: excellent (>7 METS)    ECG reviewed    (+) valvular problems/murmurs MVP, dysrhythmias (palpitations), hyperlipidemia      Neuro/Psych  (-) seizures, CVA  GI/Hepatic/Renal/Endo    (+) GERD  (-) liver disease, no renal disease, diabetes    Musculoskeletal     Abdominal    Substance History      OB/GYN    (-)  Pregnant        Other                      Anesthesia Plan    ASA 2     general     intravenous induction     Anesthetic plan, risks, benefits, and alternatives have been provided, discussed and informed consent has been obtained with: patient.

## 2023-12-07 LAB
CYTO UR: NORMAL
LAB AP CASE REPORT: NORMAL
Lab: NORMAL
PATH REPORT.FINAL DX SPEC: NORMAL
PATH REPORT.GROSS SPEC: NORMAL

## 2023-12-27 ENCOUNTER — OFFICE VISIT (OUTPATIENT)
Dept: SURGERY | Facility: CLINIC | Age: 53
End: 2023-12-27
Payer: COMMERCIAL

## 2023-12-27 VITALS
BODY MASS INDEX: 23.99 KG/M2 | HEIGHT: 65 IN | WEIGHT: 144 LBS | HEART RATE: 80 BPM | OXYGEN SATURATION: 98 % | DIASTOLIC BLOOD PRESSURE: 84 MMHG | SYSTOLIC BLOOD PRESSURE: 116 MMHG

## 2023-12-27 DIAGNOSIS — K80.50 BILIARY COLIC: ICD-10-CM

## 2023-12-27 DIAGNOSIS — Z90.49 S/P LAPAROSCOPIC CHOLECYSTECTOMY: Primary | ICD-10-CM

## 2023-12-27 PROCEDURE — 99024 POSTOP FOLLOW-UP VISIT: CPT

## 2023-12-27 NOTE — PROGRESS NOTES
"Patient: Vanessa Dunaway    YOB: 1970    Date: 12/27/2023    Primary Care Provider: Balaji Price MD    Vital Signs:   Vitals:    12/27/23 1433   BP: 116/84   BP Location: Left arm   Patient Position: Sitting   Cuff Size: Adult   Pulse: 80   SpO2: 98%   Weight: 65.3 kg (144 lb)   Height: 164.5 cm (64.76\")       Overall doing well s/p laparoscopic cholecystectomy on 12/4/23 by Dr. Lange. No fevers. Symptoms improved. Tolerating diet. Energy improving. Pain improving. Bowels moving ok. Sclera anicteric. Wounds healing well. No significant abdominal tenderness on exam. No evidence of jaundice or scleral icterus.     Results Review:   Pathology and operative findings reviewed with patient.    Tissue Pathology Exam (12/04/2023 16:39)   Gallbladder, cholecystectomy:  A.  Chronic cholecystitis, mild (acalculous).  B.  Cholesterolosis.  C.  No histologic evidence of malignancy.    Assessment / Plan:    Diagnoses and all orders for this visit:    1. S/P laparoscopic cholecystectomy (Primary)    2. Biliary colic    Ms. Dunaway is a 53-year-old female who presents to the clinic 2 weeks status post laparoscopic cholecystectomy.  She is doing well.  Incisions are healing well.  At this time all restrictions have been lifted and she is able to return to normal activity as tolerated.  She will call for an appointment if she has any new problems or concerns.  She is agreeable to this plan.    Follow up:     Return if symptoms worsen or fail to improve.        Electronically signed by Jazmin Angel PA-C  12/28/23  09:18 CST                  "

## 2024-04-09 ENCOUNTER — TRANSCRIBE ORDERS (OUTPATIENT)
Dept: ADMINISTRATIVE | Facility: HOSPITAL | Age: 54
End: 2024-04-09
Payer: COMMERCIAL

## 2024-04-09 DIAGNOSIS — Z12.31 ENCOUNTER FOR SCREENING MAMMOGRAM FOR MALIGNANT NEOPLASM OF BREAST: Primary | ICD-10-CM

## 2024-04-10 LAB
NCCN CRITERIA FLAG: NORMAL
TYRER CUZICK SCORE: 10

## 2024-04-15 ENCOUNTER — OFFICE VISIT (OUTPATIENT)
Dept: OBSTETRICS AND GYNECOLOGY | Age: 54
End: 2024-04-15
Payer: COMMERCIAL

## 2024-04-15 ENCOUNTER — HOSPITAL ENCOUNTER (OUTPATIENT)
Dept: MAMMOGRAPHY | Facility: HOSPITAL | Age: 54
Discharge: HOME | End: 2024-04-15
Admitting: NURSE PRACTITIONER
Payer: COMMERCIAL

## 2024-04-15 VITALS
BODY MASS INDEX: 23.32 KG/M2 | DIASTOLIC BLOOD PRESSURE: 84 MMHG | SYSTOLIC BLOOD PRESSURE: 116 MMHG | HEIGHT: 65 IN | WEIGHT: 140 LBS

## 2024-04-15 DIAGNOSIS — Z12.31 ENCOUNTER FOR SCREENING MAMMOGRAM FOR MALIGNANT NEOPLASM OF BREAST: ICD-10-CM

## 2024-04-15 DIAGNOSIS — N95.1 MENOPAUSAL SYMPTOMS: ICD-10-CM

## 2024-04-15 DIAGNOSIS — Z12.31 ENCOUNTER FOR SCREENING MAMMOGRAM FOR BREAST CANCER: ICD-10-CM

## 2024-04-15 DIAGNOSIS — Z01.419 WELL WOMAN EXAM WITH ROUTINE GYNECOLOGICAL EXAM: Primary | ICD-10-CM

## 2024-04-15 DIAGNOSIS — Z13.820 ENCOUNTER FOR SCREENING FOR OSTEOPOROSIS: ICD-10-CM

## 2024-04-15 DIAGNOSIS — R10.2 PELVIC PAIN: ICD-10-CM

## 2024-04-15 PROCEDURE — 77067 SCR MAMMO BI INCL CAD: CPT

## 2024-04-15 PROCEDURE — 87624 HPV HI-RISK TYP POOLED RSLT: CPT | Performed by: NURSE PRACTITIONER

## 2024-04-15 PROCEDURE — 77063 BREAST TOMOSYNTHESIS BI: CPT

## 2024-04-15 PROCEDURE — 99213 OFFICE O/P EST LOW 20 MIN: CPT | Performed by: NURSE PRACTITIONER

## 2024-04-15 PROCEDURE — 99396 PREV VISIT EST AGE 40-64: CPT | Performed by: NURSE PRACTITIONER

## 2024-04-15 PROCEDURE — G0123 SCREEN CERV/VAG THIN LAYER: HCPCS | Performed by: NURSE PRACTITIONER

## 2024-04-15 NOTE — PROGRESS NOTES
"Subjective     Vanessa Dunaway is a 53 y.o. female    History of Present Illness  She is here today as a new patient for yearly checkup.  She has complaints of occasional left lower quadrant pain and occasional menopausal symptoms to include hot flashes and night sweats.  States that right now they are stable.  Gynecologic Exam  The patient's primary symptoms include pelvic pain. The patient's pertinent negatives include no vaginal bleeding or vaginal discharge. This is a new problem. The current episode started more than 1 month ago. The problem occurs intermittently. The pain is mild. The problem affects the left sided side. Pertinent negatives include no abdominal pain, anorexia, back pain, chills, constipation, diarrhea, discolored urine, dysuria, fever, flank pain, frequency, headaches, hematuria, joint pain, joint swelling, nausea, painful intercourse, rash, sore throat, urgency or vomiting. Nothing aggravates the symptoms. She is sexually active. She is postmenopausal.         /84 (BP Location: Left arm, Patient Position: Sitting, Cuff Size: Adult)   Ht 165.1 cm (65\")   Wt 63.5 kg (140 lb)   LMP 04/18/2017   BMI 23.30 kg/m²     Outpatient Encounter Medications as of 4/15/2024   Medication Sig Dispense Refill    ALPRAZolam (XANAX) 0.25 MG tablet Take 1 tablet by mouth 3 (Three) Times a Day As Needed.      Apple Cider Vinegar 500 MG tablet Take 450 mg by mouth Daily.      Cholecalciferol (VITAMIN D3) 125 MCG (5000 UT) capsule capsule Take 1 capsule by mouth Daily.      CITRUS BERGAMOT PO Take 1,000 mg by mouth.      coenzyme Q10 100 MG capsule Take 1 capsule by mouth Daily.      Flaxseed, Linseed, (FLAXSEED OIL) 1000 MG capsule Take 1 capsule by mouth Daily.      HAWTHORN BERRY PO Take 1 capsule by mouth Daily.      Krill Oil 500 MG capsule Take  by mouth.      Magnesium 250 MG tablet Take 1 tablet by mouth 2 (Two) Times a Day.      meloxicam (MOBIC) 15 MG tablet TAKE 1 TABLET AS NEEDED ONCE A DAY  "     Misc Natural Products (COLON CLEANSE PO) Take 1 capsule by mouth Daily As Needed.      Multiple Vitamins-Minerals (MULTIVITAMIN ADULT PO) Take 1 tablet by mouth Daily.      Multiple Vitamins-Minerals (PRESERVISION AREDS 2 PO) Take  by mouth.      Plant Sterols and Stanols 450 MG capsule Take 1 capsule by mouth Daily.      Probiotic Product (PROBIOTIC DAILY PO) Take 1 capsule by mouth Daily.      saccharomyces boulardii (FLORASTOR) 250 MG capsule once a day      tiZANidine (ZANAFLEX) 4 MG tablet Take 1 tablet by mouth Every 8 (Eight) Hours As Needed.      Vitamin D-Vitamin K (VITAMIN K2-VITAMIN D3 PO) Take 1 tablet by mouth Daily.      Zinc 50 MG tablet Take 1 tablet by mouth Daily.      [DISCONTINUED] acetaminophen (Tylenol) 325 MG tablet Take 3 tablets by mouth Every 8 (Eight) Hours. Take every 8 hours for 3 days then take prn as needed.      [DISCONTINUED] alendronate (FOSAMAX) 70 MG tablet Take 1 tablet by mouth 1 (One) Time Per Week.      [DISCONTINUED] B Complex-C (SUPER B COMPLEX PO) Take 1 tablet by mouth Daily.      [DISCONTINUED] CALCIUM PO 1200 mg tablet one tablet by mouth daily      [DISCONTINUED] ibuprofen (Motrin IB) 200 MG tablet Take 3 tablets by mouth Every 8 (Eight) Hours. Take every 8 hours for three days then take as needed.      [DISCONTINUED] omeprazole (priLOSEC) 40 MG capsule Take 1 capsule by mouth Daily As Needed.      [DISCONTINUED] ondansetron (Zofran) 4 MG tablet Take 1 tablet by mouth Every 8 (Eight) Hours As Needed for Nausea or Vomiting. 15 tablet 0    [DISCONTINUED] oxyCODONE (Roxicodone) 5 MG immediate release tablet Take 1 tablet by mouth Every 8 (Eight) Hours As Needed for Severe Pain. 10 tablet 0    [DISCONTINUED] rosuvastatin (CRESTOR) 10 MG tablet Take 1 tablet by mouth every night at bedtime.       No facility-administered encounter medications on file as of 4/15/2024.       Past Medical History  Past Medical History:   Diagnosis Date    Fibroids     Frequent menstruation      GERD (gastroesophageal reflux disease)     Menstruation, excessive     Mitral valve prolapse     Palpitations     PONV (postoperative nausea and vomiting)        Surgical History  Past Surgical History:   Procedure Laterality Date    BREAST BIOPSY Right     CERVICAL LAMINECTOMY      CHOLECYSTECTOMY N/A 12/4/2023    Procedure: CHOLECYSTECTOMY LAPAROSCOPIC WITH DAVINCI ROBOT WITH ICG INJECTION, TRUCUT LIVER BIOPSY;  Surgeon: Heather Lange MD;  Location:  [facility] OR;  Service: Robotics - DaVinci;  Laterality: N/A;    COLONOSCOPY      D & C HYSTEROSCOPY ENDOMETRIAL ABLATION N/A 05/05/2017    Procedure: DILATATION AND CURETTAGE HYSTEROSCOPY NOVASURE ENDOMETRIAL ABLATION;  Surgeon: Lesvia Bermudez DO;  Location:  [facility] OR;  Service:     LASIK         Family History  Family History   Problem Relation Age of Onset    Diabetes Father     Heart disease Father     Hypertension Father     Liver cancer Father     Thyroid cancer Father     Diabetes Mother     Heart disease Mother     Hypertension Mother     No Known Problems Brother     No Known Problems Sister     No Known Problems Son     Breast cancer Maternal Aunt 62       The following portions of the patient's history were reviewed and updated as appropriate: allergies, current medications, past family history, past medical history, past social history, past surgical history, and problem list.    Review of Systems   Constitutional:  Negative for activity change, appetite change, chills, diaphoresis, fatigue, fever, unexpected weight gain and unexpected weight loss.   HENT:  Negative for congestion, dental problem, drooling, ear discharge, ear pain, facial swelling, hearing loss, mouth sores, nosebleeds, postnasal drip, rhinorrhea, sinus pressure, sneezing, sore throat, swollen glands, tinnitus, trouble swallowing and voice change.    Eyes:  Negative for blurred vision, double vision, photophobia, pain, discharge, redness, itching and visual disturbance.    Respiratory:  Negative for apnea, cough, choking, chest tightness, shortness of breath, wheezing and stridor.    Cardiovascular:  Negative for chest pain, palpitations and leg swelling.   Gastrointestinal:  Negative for abdominal distention, abdominal pain, anal bleeding, anorexia, blood in stool, constipation, diarrhea, nausea, rectal pain, vomiting, GERD and indigestion.   Endocrine: Negative for cold intolerance, heat intolerance, polydipsia, polyphagia and polyuria.   Genitourinary:  Positive for pelvic pain. Negative for amenorrhea, breast discharge, breast lump, breast pain, decreased libido, decreased urine volume, difficulty urinating, dyspareunia, dysuria, flank pain, frequency, genital sores, hematuria, menstrual problem, pelvic pressure, urgency, urinary incontinence, vaginal bleeding, vaginal discharge and vaginal pain.   Musculoskeletal:  Negative for arthralgias, back pain, gait problem, joint pain, joint swelling, myalgias, neck pain, neck stiffness and bursitis.   Skin:  Negative for color change, dry skin and rash.   Allergic/Immunologic: Negative for environmental allergies, food allergies and immunocompromised state.   Neurological:  Negative for dizziness, tremors, seizures, syncope, facial asymmetry, speech difficulty, weakness, light-headedness, numbness, headache, memory problem and confusion.   Hematological:  Negative for adenopathy. Does not bruise/bleed easily.   Psychiatric/Behavioral:  Negative for agitation, behavioral problems, decreased concentration, dysphoric mood, hallucinations, self-injury, sleep disturbance, suicidal ideas, negative for hyperactivity, depressed mood and stress. The patient is not nervous/anxious.        Objective   Physical Exam  Vitals and nursing note reviewed. Exam conducted with a chaperone present.   Constitutional:       General: She is not in acute distress.     Appearance: She is well-developed. She is not diaphoretic.   HENT:      Head: Normocephalic.       Right Ear: External ear normal.      Left Ear: External ear normal.      Nose: Nose normal.   Eyes:      General: No scleral icterus.        Right eye: No discharge.         Left eye: No discharge.      Conjunctiva/sclera: Conjunctivae normal.      Pupils: Pupils are equal, round, and reactive to light.   Neck:      Thyroid: No thyromegaly.      Vascular: No carotid bruit.      Trachea: No tracheal deviation.   Cardiovascular:      Rate and Rhythm: Normal rate and regular rhythm.      Heart sounds: Normal heart sounds. No murmur heard.  Pulmonary:      Effort: Pulmonary effort is normal. No respiratory distress.      Breath sounds: Normal breath sounds. No wheezing.   Chest:   Breasts:     Breasts are symmetrical.      Right: Normal. No swelling, bleeding, inverted nipple, mass, nipple discharge, skin change or tenderness.      Left: Normal. No swelling, bleeding, inverted nipple, mass, nipple discharge, skin change or tenderness.   Abdominal:      General: There is no distension.      Palpations: Abdomen is soft. There is no mass.      Tenderness: There is no abdominal tenderness. There is no right CVA tenderness, left CVA tenderness or guarding.      Hernia: No hernia is present. There is no hernia in the left inguinal area or right inguinal area.   Genitourinary:     General: Normal vulva.      Exam position: Lithotomy position.      Labia:         Right: No rash, tenderness, lesion or injury.         Left: No rash, tenderness, lesion or injury.       Vagina: Normal. No signs of injury and foreign body. No vaginal discharge, erythema, tenderness or bleeding.      Cervix: Normal.      Uterus: Normal. Enlarged. Not fixed and not tender.       Adnexa: Right adnexa normal and left adnexa normal.        Right: No mass, tenderness or fullness.          Left: No mass, tenderness or fullness.        Rectum: Normal. No mass.      Comments:   BSU normal  Urethral meatus  Normal  Perineum  Normal  Musculoskeletal:          General: No tenderness. Normal range of motion.      Cervical back: Normal range of motion and neck supple.   Lymphadenopathy:      Head:      Right side of head: No submental, submandibular, tonsillar, preauricular, posterior auricular or occipital adenopathy.      Left side of head: No submental, submandibular, tonsillar, preauricular, posterior auricular or occipital adenopathy.      Cervical: No cervical adenopathy.      Right cervical: No superficial, deep or posterior cervical adenopathy.     Left cervical: No superficial, deep or posterior cervical adenopathy.      Upper Body:      Right upper body: No supraclavicular, axillary or pectoral adenopathy.      Left upper body: No supraclavicular, axillary or pectoral adenopathy.      Lower Body: No right inguinal adenopathy. No left inguinal adenopathy.   Skin:     General: Skin is warm and dry.      Findings: No bruising, erythema or rash.   Neurological:      Mental Status: She is alert and oriented to person, place, and time.      Coordination: Coordination normal.   Psychiatric:         Mood and Affect: Mood normal.         Behavior: Behavior normal.         Thought Content: Thought content normal.         Judgment: Judgment normal.         PHQ-9 Depression Screening  Little interest or pleasure in doing things? 0-->not at all   Feeling down, depressed, or hopeless? 0-->not at all   Trouble falling or staying asleep, or sleeping too much?     Feeling tired or having little energy?     Poor appetite or overeating?     Feeling bad about yourself - or that you are a failure or have let yourself or your family down?     Trouble concentrating on things, such as reading the newspaper or watching television?     Moving or speaking so slowly that other people could have noticed? Or the opposite - being so fidgety or restless that you have been moving around a lot more than usual?     Thoughts that you would be better off dead, or of hurting yourself in some way?      PHQ-9 Total Score 0   If you checked off any problems, how difficult have these problems made it for you to do your work, take care of things at home, or get along with other people?          Assessment & Plan   Diagnoses and all orders for this visit:    1. Well woman exam with routine gynecological exam (Primary)  Normal GYN exam. Will have lab work at PCP. Encouraged SBE, pt is aware how to do self breast exam and the importance of same. Discussed weight management and importance of maintaining a healthy weight. Discussed Vitamin D intake and the importance of adequate vitamin D for both Bone Health and a healthy immune system.  Discussed Daily exercise and the importance of same, in regards to a healthy heart as well as helping to maintain her weight and improving her mental health.  BMI 23.3.  Colonoscopy is up to date.  Mammogram was done today.  Bone density will be scheduled at [facility].  Pap smear is done after  we discussed ASCCP guidelines.  After informed decision patient wishes to proceed with the Pap smear.       -     Liquid-based Pap Smear, Screening    2. Encounter for screening mammogram for breast cancer  Comments:  Patient had a mammogram done today at [facility].    3. Menopausal symptoms  Comments:  Patient is having occasional menopausal symptoms.  She does not want any hormone replacement at the present time.  She will call back if she does.  She is also instructed to call with any bleeding or spotting.    4. Pelvic pain  Comments:  Has occasional pelvic pain especially in the left lower quadrant.  Has history of uterine fibroid.  She will return for pelvic ultrasound.    5. Encounter for screening for osteoporosis  Comments:  Patient will have a bone density at [facility].  Orders:  -     DEXA Bone Density Axial; Future         BMI is within normal parameters. No other follow-up for BMI required.      Bina Caal, APRN  4/15/2024

## 2024-04-16 LAB
GEN CATEG CVX/VAG CYTO-IMP: NORMAL
HPV I/H RISK 4 DNA CVX QL PROBE+SIG AMP: NOT DETECTED
LAB AP CASE REPORT: NORMAL
LAB AP GYN ADDITIONAL INFORMATION: NORMAL
Lab: NORMAL
PATH INTERP SPEC-IMP: NORMAL
STAT OF ADQ CVX/VAG CYTO-IMP: NORMAL

## 2024-05-22 ENCOUNTER — TRANSCRIBE ORDERS (OUTPATIENT)
Dept: ADMINISTRATIVE | Facility: HOSPITAL | Age: 54
End: 2024-05-22
Payer: COMMERCIAL

## 2024-05-22 DIAGNOSIS — M54.50 ACUTE LOW BACK PAIN, UNSPECIFIED BACK PAIN LATERALITY, UNSPECIFIED WHETHER SCIATICA PRESENT: Primary | ICD-10-CM

## 2024-05-23 ENCOUNTER — HOSPITAL ENCOUNTER (OUTPATIENT)
Dept: GENERAL RADIOLOGY | Facility: HOSPITAL | Age: 54
Discharge: HOME | End: 2024-05-23
Payer: COMMERCIAL

## 2024-05-23 ENCOUNTER — HOSPITAL ENCOUNTER (OUTPATIENT)
Dept: BONE DENSITY | Facility: HOSPITAL | Age: 54
Discharge: HOME | End: 2024-05-23
Payer: COMMERCIAL

## 2024-05-23 DIAGNOSIS — M54.50 ACUTE LOW BACK PAIN, UNSPECIFIED BACK PAIN LATERALITY, UNSPECIFIED WHETHER SCIATICA PRESENT: ICD-10-CM

## 2024-05-23 DIAGNOSIS — Z13.820 ENCOUNTER FOR SCREENING FOR OSTEOPOROSIS: ICD-10-CM

## 2024-05-23 PROCEDURE — 72110 X-RAY EXAM L-2 SPINE 4/>VWS: CPT

## 2024-05-23 PROCEDURE — 77080 DXA BONE DENSITY AXIAL: CPT

## 2024-05-24 ENCOUNTER — TELEMEDICINE (OUTPATIENT)
Dept: OBSTETRICS AND GYNECOLOGY | Age: 54
End: 2024-05-24
Payer: COMMERCIAL

## 2024-05-24 DIAGNOSIS — R10.2 PELVIC PAIN: Primary | ICD-10-CM

## 2024-05-24 PROCEDURE — 99213 OFFICE O/P EST LOW 20 MIN: CPT | Performed by: NURSE PRACTITIONER

## 2024-05-24 NOTE — PROGRESS NOTES
Subjective     Vanessa Dunaway is a 53 y.o. female    History of Present Illness  You have chosen to receive care through a telehealth visit.  Do you consent to use a video/audio connection for your medical care today? Yes    Patient calls for discussion of pelvic ultrasound that was done yesterday for pelvic pain.  Fibroids  This is a chronic problem. The current episode started more than 1 year ago. The problem has been gradually worsening. Pertinent negatives include no abdominal pain, anorexia, arthralgias, change in bowel habit, chest pain, chills, congestion, coughing, fatigue, fever, headaches, joint swelling, myalgias, nausea, neck pain, numbness, rash, sore throat, swollen glands, urinary symptoms, vertigo, visual change, vomiting or weakness.         [facility] 04/18/2017     Outpatient Encounter Medications as of 5/24/2024   Medication Sig Dispense Refill    ALPRAZolam (XANAX) 0.25 MG tablet Take 1 tablet by mouth 3 (Three) Times a Day As Needed.      Apple Cider Vinegar 500 MG tablet Take 450 mg by mouth Daily.      Cholecalciferol (VITAMIN D3) 125 MCG (5000 UT) capsule capsule Take 1 capsule by mouth Daily.      CITRUS BERGAMOT PO Take 1,000 mg by mouth.      coenzyme Q10 100 MG capsule Take 1 capsule by mouth Daily.      Flaxseed, Linseed, (FLAXSEED OIL) 1000 MG capsule Take 1 capsule by mouth Daily.      HAWTHORN BERRY PO Take 1 capsule by mouth Daily.      Krill Oil 500 MG capsule Take  by mouth.      Magnesium 250 MG tablet Take 1 tablet by mouth 2 (Two) Times a Day.      meloxicam (MOBIC) 15 MG tablet TAKE 1 TABLET AS NEEDED ONCE A DAY      Misc Natural Products (COLON CLEANSE PO) Take 1 capsule by mouth Daily As Needed.      Multiple Vitamins-Minerals (MULTIVITAMIN ADULT PO) Take 1 tablet by mouth Daily.      Multiple Vitamins-Minerals (PRESERVISION AREDS 2 PO) Take  by mouth.      Plant Sterols and Stanols 450 MG capsule Take 1 capsule by mouth Daily.      Probiotic Product (PROBIOTIC DAILY PO) Take  1 capsule by mouth Daily.      saccharomyces boulardii (FLORASTOR) 250 MG capsule once a day      tiZANidine (ZANAFLEX) 4 MG tablet Take 1 tablet by mouth Every 8 (Eight) Hours As Needed.      Vitamin D-Vitamin K (VITAMIN K2-VITAMIN D3 PO) Take 1 tablet by mouth Daily.      Zinc 50 MG tablet Take 1 tablet by mouth Daily.       No facility-administered encounter medications on file as of 5/24/2024.       Past Medical History  Past Medical History:   Diagnosis Date    Fibroids     Frequent menstruation     GERD (gastroesophageal reflux disease)     Menstruation, excessive     Mitral valve prolapse     Palpitations     PONV (postoperative nausea and vomiting)        Surgical History  Past Surgical History:   Procedure Laterality Date    BREAST BIOPSY Right     CERVICAL LAMINECTOMY      CHOLECYSTECTOMY N/A 12/4/2023    Procedure: CHOLECYSTECTOMY LAPAROSCOPIC WITH 1006.tvINCI ROBOT WITH ICG INJECTION, TRUCUT LIVER BIOPSY;  Surgeon: Heather Lange MD;  Location: [facility] OR;  Service: Robotics - DaVinci;  Laterality: N/A;    COLONOSCOPY      D & C HYSTEROSCOPY ENDOMETRIAL ABLATION N/A 05/05/2017    Procedure: DILATATION AND CURETTAGE HYSTEROSCOPY NOVASURE ENDOMETRIAL ABLATION;  Surgeon: Lesvia Bermudez DO;  Location:  [facility] OR;  Service:     LASIK         Family History  Family History   Problem Relation Age of Onset    Diabetes Father     Heart disease Father     Hypertension Father     Liver cancer Father     Thyroid cancer Father     Diabetes Mother     Heart disease Mother     Hypertension Mother     No Known Problems Brother     No Known Problems Sister     No Known Problems Son     Breast cancer Maternal Aunt 62       The following portions of the patient's history were reviewed and updated as appropriate: allergies, current medications, past family history, past medical history, past social history, past surgical history, and problem list.    Review of Systems   Constitutional:  Negative for chills, fatigue  and fever.   HENT:  Negative for congestion, sore throat and swollen glands.    Respiratory:  Negative for cough.    Cardiovascular:  Negative for chest pain.   Gastrointestinal:  Negative for abdominal pain, anorexia, change in bowel habit, nausea and vomiting.   Musculoskeletal:  Negative for arthralgias, joint swelling, myalgias and neck pain.   Skin:  Negative for rash.   Neurological:  Negative for vertigo, weakness and numbness.       Objective   Physical Exam  Constitutional:       General: She is not in acute distress.     Appearance: Normal appearance. She is not ill-appearing or diaphoretic.   HENT:      Head: Normocephalic and atraumatic.   Pulmonary:      Effort: Pulmonary effort is normal. No respiratory distress.   Musculoskeletal:         General: No deformity.      Cervical back: Normal range of motion.   Skin:     Coloration: Skin is not pale.   Neurological:      General: No focal deficit present.      Mental Status: She is alert.   Psychiatric:         Mood and Affect: Mood normal.         Behavior: Behavior normal.         Thought Content: Thought content normal.         Judgment: Judgment normal.         PHQ-9 Depression Screening  Little interest or pleasure in doing things?     Feeling down, depressed, or hopeless?     Trouble falling or staying asleep, or sleeping too much?     Feeling tired or having little energy?     Poor appetite or overeating?     Feeling bad about yourself - or that you are a failure or have let yourself or your family down?     Trouble concentrating on things, such as reading the newspaper or watching television?     Moving or speaking so slowly that other people could have noticed? Or the opposite - being so fidgety or restless that you have been moving around a lot more than usual?     Thoughts that you would be better off dead, or of hurting yourself in some way?     PHQ-9 Total Score     If you checked off any problems, how difficult have these problems made it for  you to do your work, take care of things at home, or get along with other people?          Assessment & Plan   Diagnoses and all orders for this visit:    1. Pelvic pain (Primary)  Comments:  Patient called to discuss pelvic pain.  She had a pelvic ultrasound yesterday which showed her fibroid was slightly enlarged.  She does not want to do anything     This was an audio enabled telemedicine encounter.      BMI is within normal parameters. No other follow-up for BMI required.      Bina Caal, APRN  5/24/2024

## 2025-03-17 ENCOUNTER — TRANSCRIBE ORDERS (OUTPATIENT)
Dept: ADMINISTRATIVE | Facility: HOSPITAL | Age: 55
End: 2025-03-17
Payer: COMMERCIAL

## 2025-03-17 DIAGNOSIS — Z12.31 OTHER SCREENING MAMMOGRAM: Primary | ICD-10-CM

## 2025-04-26 LAB
NCCN CRITERIA FLAG: NORMAL
TYRER CUZICK SCORE: 9.7

## 2025-04-28 ENCOUNTER — HOSPITAL ENCOUNTER (OUTPATIENT)
Dept: MAMMOGRAPHY | Facility: HOSPITAL | Age: 55
Discharge: HOME | End: 2025-04-28
Admitting: NURSE PRACTITIONER
Payer: COMMERCIAL

## 2025-04-28 ENCOUNTER — OFFICE VISIT (OUTPATIENT)
Age: 55
End: 2025-04-28
Payer: COMMERCIAL

## 2025-04-28 ENCOUNTER — TRANSCRIBE ORDERS (OUTPATIENT)
Dept: ADMINISTRATIVE | Facility: HOSPITAL | Age: 55
End: 2025-04-28
Payer: COMMERCIAL

## 2025-04-28 VITALS
HEIGHT: 65 IN | WEIGHT: 142 LBS | SYSTOLIC BLOOD PRESSURE: 122 MMHG | BODY MASS INDEX: 23.66 KG/M2 | DIASTOLIC BLOOD PRESSURE: 84 MMHG

## 2025-04-28 DIAGNOSIS — Z87.448 HISTORY OF HEMATURIA: ICD-10-CM

## 2025-04-28 DIAGNOSIS — Z01.419 ENCOUNTER FOR GYNECOLOGICAL EXAMINATION WITHOUT ABNORMAL FINDING: Primary | ICD-10-CM

## 2025-04-28 DIAGNOSIS — Z12.31 OTHER SCREENING MAMMOGRAM: ICD-10-CM

## 2025-04-28 DIAGNOSIS — Z12.31 SCREENING MAMMOGRAM, ENCOUNTER FOR: ICD-10-CM

## 2025-04-28 DIAGNOSIS — Z12.31 ENCOUNTER FOR SCREENING MAMMOGRAM FOR MALIGNANT NEOPLASM OF BREAST: Primary | ICD-10-CM

## 2025-04-28 LAB
BILIRUB BLD-MCNC: NEGATIVE MG/DL
CLARITY, POC: CLEAR
COLOR UR: YELLOW
GLUCOSE UR STRIP-MCNC: NEGATIVE MG/DL
KETONES UR QL: NEGATIVE
LEUKOCYTE EST, POC: NEGATIVE
NITRITE UR-MCNC: NEGATIVE MG/ML
PH UR: 5 [PH] (ref 5–8)
PROT UR STRIP-MCNC: ABNORMAL MG/DL
RBC # UR STRIP: NEGATIVE /UL
SP GR UR: 1.01 (ref 1–1.03)
UROBILINOGEN UR QL: NORMAL

## 2025-04-28 PROCEDURE — 77067 SCR MAMMO BI INCL CAD: CPT

## 2025-04-28 PROCEDURE — 77063 BREAST TOMOSYNTHESIS BI: CPT

## 2025-04-28 NOTE — PROGRESS NOTES
Chief Complaint  Annual Exam (Pt is here for an annual exam /Last pap 4/15/24 WNL /Last mammogram 4/15/24 Benign /Last dexa scan 5/23/24 Osteopenia /Pt has no complaints today )  History of Present Illness  The patient is a 55-year-old female who presents for an annual exam.    A few months ago, an episode of hematuria was experienced, described as resembling fruit punch. A urinalysis was requested to confirm resolution of this issue. Lifelong constipation is reported, attributed to IBS-C. Despite maintaining a high-fiber diet and adequate hydration, symptoms persist. Various medications have been tried without significant relief.   Occasional urinary incontinence is noted, particularly when water intake is excessive. This is managed by avoiding water consumption prior to daily 4-mile walks.   A few years ago, consultation with a pelvic  was sought, but commitment to the recommended exercises was not possible due to caregiving responsibilities for her parents. These exercises are continued as time permits. No urinary leakage occurs during coughing, sneezing, or jumping on a trampoline, but occasional urgency is experienced. No vaginal bleeding, discharge, itching, or odor is reported. Annual labs, including cholesterol and blood sugar levels, are managed by her primary care physician. STD testing is declined. A mammogram was performed at [facility], operated by [facility] this morning.     A history of uterine fibroid is noted, with several ultrasounds performed. The most recent ultrasound was in 05/2024 during a pelvic exam. A family history of osteopenia is present in her sister, who also experienced premature ovarian failure at age 35. Fosamax was taken for 4 years before discontinuing it and switching to vitamin D, K, and magnesium supplements. Walking is incorporated into her routine. Calcium supplements are avoided due to their constipating effect, but adequate intake is ensured through her diet.    Subjective       "    Vanessa Dunaway presents to [facility]  History of Present Illness    Review of Systems   Constitutional:  Negative for activity change, appetite change, fatigue and fever.   HENT:  Negative for congestion, sore throat and trouble swallowing.    Eyes:  Negative for pain, discharge and visual disturbance.   Respiratory:  Negative for apnea, shortness of breath and wheezing.    Cardiovascular:  Negative for chest pain, palpitations and leg swelling.   Gastrointestinal:  Negative for abdominal pain, constipation and diarrhea.   Genitourinary:  Negative for frequency, pelvic pain and vaginal discharge. Urgency: occasional.       Recent hematuria, UA requested today   Musculoskeletal:  Negative for back pain and gait problem.   Skin:  Negative for color change and rash.   Neurological:  Negative for dizziness, weakness and numbness.   Psychiatric/Behavioral:  Negative for confusion and sleep disturbance.         Objective   Vital Signs:   /84   Ht 165.1 cm (65\")   Wt 64.4 kg (142 lb)   BMI 23.63 kg/m²     Physical Exam  Vitals and nursing note reviewed. Exam conducted with a chaperone present.   Constitutional:       General: She is not in acute distress.     Appearance: She is well-developed. She is not diaphoretic.   HENT:      Head: Normocephalic.      Right Ear: External ear normal.      Left Ear: External ear normal.      Nose: Nose normal.   Eyes:      General: No scleral icterus.        Right eye: No discharge.         Left eye: No discharge.      Conjunctiva/sclera: Conjunctivae normal.      Pupils: Pupils are equal, round, and reactive to light.   Neck:      Thyroid: No thyromegaly.      Vascular: No carotid bruit.      Trachea: No tracheal deviation.   Cardiovascular:      Rate and Rhythm: Normal rate and regular rhythm.      Heart sounds: Normal heart sounds. No murmur heard.  Pulmonary:      Effort: Pulmonary effort is normal. No respiratory distress.      Breath " sounds: Normal breath sounds. No wheezing.   Chest:   Breasts:     Breasts are symmetrical.      Right: Normal. No swelling, bleeding, inverted nipple, mass, nipple discharge, skin change or tenderness.      Left: Normal. No swelling, bleeding, inverted nipple, mass, nipple discharge, skin change or tenderness.   Abdominal:      General: There is no distension.      Palpations: Abdomen is soft. There is no mass.      Tenderness: There is no abdominal tenderness. There is no right CVA tenderness, left CVA tenderness or guarding.      Hernia: No hernia is present. There is no hernia in the left inguinal area or right inguinal area.   Genitourinary:     General: Normal vulva.      Exam position: Lithotomy position.      Labia:         Right: No rash, tenderness, lesion or injury.         Left: No rash, tenderness, lesion or injury.       Vagina: Normal. No signs of injury and foreign body. No vaginal discharge, erythema, tenderness or bleeding.      Cervix: Normal.      Uterus: Normal. Not enlarged, not fixed and not tender.       Adnexa: Right adnexa normal and left adnexa normal.        Right: No mass, tenderness or fullness.          Left: No mass, tenderness or fullness.        Rectum: Normal. No mass.      Comments:   BSU normal  Urethral meatus  Normal  Perineum  Normal    Mild vaginal tone weakness  Musculoskeletal:         General: No tenderness. Normal range of motion.      Cervical back: Normal range of motion and neck supple.   Lymphadenopathy:      Head:      Right side of head: No submental, submandibular, tonsillar, preauricular, posterior auricular or occipital adenopathy.      Left side of head: No submental, submandibular, tonsillar, preauricular, posterior auricular or occipital adenopathy.      Cervical: No cervical adenopathy.      Right cervical: No superficial, deep or posterior cervical adenopathy.     Left cervical: No superficial, deep or posterior cervical adenopathy.      Upper Body:       Right upper body: No supraclavicular, axillary or pectoral adenopathy.      Left upper body: No supraclavicular, axillary or pectoral adenopathy.      Lower Body: No right inguinal adenopathy. No left inguinal adenopathy.   Skin:     General: Skin is warm and dry.      Findings: No bruising, erythema or rash.   Neurological:      Mental Status: She is alert and oriented to person, place, and time.      Coordination: Coordination normal.   Psychiatric:         Mood and Affect: Mood normal.         Behavior: Behavior normal.         Thought Content: Thought content normal.         Judgment: Judgment normal.       Result Review :                   Assessment and Plan      Well woman GYN exam.   Pap smear not indicated per ASCCP guidelines.   Pelvic exam with chaperone present.     Will have lab work at PCP.     Colonoscopy is up to date    Bone density 2024    Discussed STD prevention and testing.   Pt declines Chlamydia/Gonorrhea/Trichomonas, RPR, Hep panel and HIV testing.     Mammogram will be scheduled at [facility].     Assessment & Plan  1.  Fibriod  Discussed hx of fibroid. Reviewed S&S to report.     2. Hematuria.  Reported an episode of hematuria a few months ago, describing it as resembling fruit punch. Urine dipstick test today was normal. Declined sending the sample for culture.    Diagnoses and all orders for this visit:    1. Encounter for gynecological examination without abnormal finding (Primary)    2. Screening mammogram, encounter for    3. History of hematuria  -     POC Urinalysis Dipstick          BMI is within normal parameters. No other follow-up for BMI required.       Follow Up   Return for Annual physical.    Patient was given instructions and counseling regarding her condition or for health maintenance advice. Please see specific information pulled into the AVS if appropriate.     Patient or patient representative verbalized consent for the use of Ambient Listening during the visit with  Nallely  GARLAND Alexandre for chart documentation. 5/8/2025  22:01 CDT

## 2025-04-28 NOTE — PATIENT INSTRUCTIONS

## 2025-05-06 ENCOUNTER — TRANSCRIBE ORDERS (OUTPATIENT)
Dept: ADMINISTRATIVE | Facility: HOSPITAL | Age: 55
End: 2025-05-06
Payer: COMMERCIAL

## 2025-05-06 DIAGNOSIS — Z12.31 ENCOUNTER FOR SCREENING MAMMOGRAM FOR MALIGNANT NEOPLASM OF BREAST: Primary | ICD-10-CM

## (undated) DEVICE — CAP CONN RED

## (undated) DEVICE — SUT MNCRYL 4/0 PS2 27IN UD MCP426H

## (undated) DEVICE — TUBING, SUCTION, 1/4" X 12', STRAIGHT: Brand: MEDLINE

## (undated) DEVICE — KT CLN CLEANOR SCPE

## (undated) DEVICE — PAD D&C: Brand: MEDLINE INDUSTRIES, INC.

## (undated) DEVICE — ARM DRAPE

## (undated) DEVICE — GLV SURG SENSICARE W/ALOE PF LF 6.5 STRL

## (undated) DEVICE — PK TURNOVER RM ADV

## (undated) DEVICE — PROB ABL ENDOMTRL NOVASURE/G1 W/SURESND BIP

## (undated) DEVICE — DAVINCI: Brand: MEDLINE INDUSTRIES, INC.

## (undated) DEVICE — ELECTRD BLD EZ CLN MOD XLNG 2.75IN

## (undated) DEVICE — ADHS SKIN PREMIERPRO EXOFIN TOPICAL HI/VISC .5ML

## (undated) DEVICE — NDL HYPO PRECISIONGLIDE REG 21G 1 1/2

## (undated) DEVICE — GLV SURG TRIUMPH ORTHO W/ALOE PF LTX 6 STRL

## (undated) DEVICE — TIP COVER ACCESSORY

## (undated) DEVICE — TISSUE RETRIEVAL SYSTEM: Brand: INZII RETRIEVAL SYSTEM

## (undated) DEVICE — CYSTO/BLADDER IRRIGATION SET, REGULATING CLAMP

## (undated) DEVICE — BLADELESS OBTURATOR: Brand: WECK VISTA

## (undated) DEVICE — APPL CHLORAPREP HI/LITE 26ML ORNG

## (undated) DEVICE — SYR LL TP 10ML STRL

## (undated) DEVICE — SEAL

## (undated) DEVICE — GLV SURG SENSICARE W/ALOE PF LF SZ6 STRL

## (undated) DEVICE — GLV SURG TRIUMPH ORTHO W/ALOE PF LTX 6.5 STRL